# Patient Record
Sex: FEMALE | Race: WHITE
[De-identification: names, ages, dates, MRNs, and addresses within clinical notes are randomized per-mention and may not be internally consistent; named-entity substitution may affect disease eponyms.]

---

## 2017-01-26 ENCOUNTER — HOSPITAL ENCOUNTER (OUTPATIENT)
Dept: HOSPITAL 47 - RADMAMWWP | Age: 37
Discharge: HOME | End: 2017-01-26
Payer: COMMERCIAL

## 2017-01-26 ENCOUNTER — HOSPITAL ENCOUNTER (OUTPATIENT)
Dept: HOSPITAL 47 - RADUSWWP | Age: 37
Discharge: HOME | End: 2017-01-26
Payer: COMMERCIAL

## 2017-01-26 DIAGNOSIS — E04.2: Primary | ICD-10-CM

## 2017-01-26 DIAGNOSIS — Z12.31: Primary | ICD-10-CM

## 2017-01-26 PROCEDURE — 76536 US EXAM OF HEAD AND NECK: CPT

## 2017-01-26 PROCEDURE — 84443 ASSAY THYROID STIM HORMONE: CPT

## 2017-01-26 PROCEDURE — 77063 BREAST TOMOSYNTHESIS BI: CPT

## 2017-01-26 NOTE — US
EXAMINATION TYPE: US thyroid st tissue head/neck

 

DATE OF EXAM: 1/26/2017 4:38 PM

 

COMPARISON: 2/16/2016

 

CLINICAL HISTORY: 36-year-old female E04.1 Thyroid Nodule. FOLLOW UP 

 

TECHNIQUE: Multiple sonographic images of the thyroid gland were obtained.

 

FINDINGS:

 

GLAND SIZE:

 

Right Lobe: 4.8 x 1.3 x 1.3 cm

Left Lobe: 4.6 x 1.5 x 1.4  cm

Isthmus Thickness:  0.3 cm

 

NODULES

 

RIGHT:   # of nodules measured on right:   3 

1. 0.7  X 0.4  x 0.4 cm cystic nodule with internal echogenic focus suggestive of a colloid cyst at t
he upper pole with well-defined margins; .  This nodule is wider than tall and shows no intranodular 
vascularity.

** Prior size: 0.7 x 0.5  x 0.4 cm 

2. 0.3 X 0.3  x 0.2 cm probable spongiform nodule at the mid pole with well-defined margins; .  This 
nodule is wider than tall and shows intranodular vascularity.

 ** Prior size:  0.3 x 0.3  x 0.3 cm 

3. 0.4 X 0.3  x 0.3 cm mixed nodule at the lower pole with well-defined margins; .  This nodule is wi
mary alice than tall and shows intranodular vascularity.

** Prior size:  0.4 x 0.3  x 0.3 cm 

 

 

LEFT:    # of nodules measured on left:  2

1.  1.5 X 1.1  x 1.0 cm primarily cystic nodule with internal echogenic foci at the mid pole with wel
l-defined margins; .  This nodule is round and shows peripheral vascularity.

** Prior size: 1.3 x 1.1  x 1.0 cm 

2. 1.1 X 1.1  x 1.4 cm heterogeneous solid nodule possibly part of or just adjacent to the above-ment
ioned complex cystic nodule at the mid/inferior pole with well-defined margins; .  This nodule shows 
intranodular vascularity.

 ** Prior size:  1.0 x 1.2  x 1.3 cm 

 

ISTHMUS:    # of nodules measured in the isthmus:  0

 

 

 

 

IMPRESSION:

Overall stable exam with 3 nodules on the right and 2 nodules on the left. The dominant solid nodule 
in the left lower pole measures 1.4 x 1.1 cm versus 1.3 x 1.2 cm, previously, not significantly palm
ed.

## 2017-01-27 NOTE — MM
Reason for exam: screening  (asymptomatic).

Baseline mammogram.



History:

Family history of breast cancer in mother at age 54 and breast cancer in 

grandmother at age 82.

Taking hormonal contraceptives for 12 years.



Physical Findings:

Nurse did not find any significant physical abnormalities on exam.



MG 3D Screening Mammo W/Cad

Bilateral CC and MLO view(s) were taken.

No persisting abnormality on tomosynthesis images regional round and punctate 

calcifications anterior left breast are benign.





ASSESSMENT: Negative, BI-RAD 1



RECOMMENDATION:

Routine screening mammogram of both breasts at age 41.

Unless clinical indication to start sooner.

## 2019-01-23 ENCOUNTER — HOSPITAL ENCOUNTER (OUTPATIENT)
Dept: HOSPITAL 47 - RADUSWWP | Age: 39
Discharge: HOME | End: 2019-01-23
Attending: INTERNAL MEDICINE
Payer: COMMERCIAL

## 2019-01-23 DIAGNOSIS — E04.2: Primary | ICD-10-CM

## 2019-01-23 PROCEDURE — 86376 MICROSOMAL ANTIBODY EACH: CPT

## 2019-01-23 PROCEDURE — 84443 ASSAY THYROID STIM HORMONE: CPT

## 2019-01-23 PROCEDURE — 76536 US EXAM OF HEAD AND NECK: CPT

## 2019-01-23 NOTE — US
EXAMINATION TYPE: US thyroid st tissue head/neck

 

DATE OF EXAM: 1/23/2019

 

COMPARISON: US

 

CLINICAL HISTORY: E04.2 thyroid nodule-compare to previous. F/U

 

GLAND SIZE:

 

Right Lobe: 4.3 x 1.5 x 1.2 cm

** Overall Parenchyma:  homogenous

Left Lobe: 4.6 x 1.6 x 1.6 cm

** Overall Parenchyma:  homogeneous

Isthmus Thickness:  0.3 cm

 

NODULES

 

RIGHT:   # of nodules measured on right: 3

1.  0.9 X 0.5  x 0.5 cm  cystic nodule at the upper pole with well-defined margins;  This nodule is w
ider than tall and shows no intranodular vascularity.

** Prior size: 0.7 x 0.4  x 0.4 cm 

2. 0.3 X 0.3  x 0.2 cm hypoechoic solid nodule at the mid pole with well-defined margins;  This nodul
e is wider than tall and shows intranodular vascularity.

 ** Prior size:  0.3 x 0.3  x 0.2 cm 

3. 0.5 X 0.5  x 0.5 cm isoechoic solid nodule at the lower pole with poorly defined margins;  This no
dule is wider than tall and shows intranodular vascularity.

** Prior size:  0.4 x 0.3  x 0.3 cm 

 

 

LEFT:    # of nodules measured on left:  1

1.  2.0 X 1.3  x 1.4 cm  mixed nodule at the mid pole with well-defined margins;  This nodule is wide
r than tall and shows intranodular vascularity.

** Prior size: 2.7 x 1.5  x 1.7 cm **This measurement more accurate on 2014 scan than most recent on 
2017 scan**

 

 

Bilateral neck scanned, no evidence of lymphadenopathy. Stable nodules on right thyroid and tech felt
 stable nodule on left when compared to 2014 scan.

 

 

 

 

 

IMPRESSION:

Stable nonspecific nodularity.

## 2020-07-27 ENCOUNTER — HOSPITAL ENCOUNTER (OUTPATIENT)
Dept: HOSPITAL 47 - RADUSWWP | Age: 40
Discharge: HOME | End: 2020-07-27
Attending: INTERNAL MEDICINE
Payer: COMMERCIAL

## 2020-07-27 DIAGNOSIS — E04.2: Primary | ICD-10-CM

## 2020-07-27 PROCEDURE — 76536 US EXAM OF HEAD AND NECK: CPT

## 2020-07-28 NOTE — US
EXAMINATION TYPE: US thyroid st tissue head/neck

 

DATE OF EXAM: 7/27/2020

 

COMPARISON: US 1/23/2019

 

CLINICAL HISTORY: E04.1 SINGLE THYROID NODULE.

 

GLAND SIZE:

 

Right Lobe: 4.6 x 1.5 x 1.4 cm

** Overall Parenchyma:  homogenous

Left Lobe: 4.9 x 1.7 x 1.6 cm

** Overall Parenchyma:  homogeneous

Isthmus Thickness:  0.3 cm

 

NODULES

 

RIGHT:   # of nodules measured on right: 2

1.  0.5 X 0.3  x 0.3 cm hypoechoic mixed nodule at the lower pole with well-defined margins; .  This 
nodule is wider than tall and shows intranodular vascularity.

** Prior size: 0.4 x0.3 x 0.3  cm

2. 1.0 X 0.5  x 0.5 cm hypoechoic cystic nodule at the mid pole with well-defined margins; .  This no
dule is wider than tall and shows no intranodular vascularity.

 ** Prior size:  0.9 x 0.5  x 0.5 cm 

 

 

LEFT:    # of nodules measured on left:  1

1.  3.2 X 1.5  x 1.5 cm hypoechoic mixed nodule at the mid pole with well-defined margins; .  This no
dule is wider than tall and shows intranodular vascularity.

** Prior size: 2.0 x 1.3  x 1.4 cm 

 

ISTHMUS:    # of nodules measured in the isthmus:  0

 

 

Bilateral neck scanned, no evidence of lymphadenopathy.

 

 

 

 

 

IMPRESSION:

Nonspecific thyroid nodularity seen bilaterally. Enlarging nodule left thyroid lobe.

## 2020-07-29 ENCOUNTER — HOSPITAL ENCOUNTER (OUTPATIENT)
Dept: HOSPITAL 47 - LABWHC1 | Age: 40
Discharge: HOME | End: 2020-07-29
Attending: INTERNAL MEDICINE
Payer: COMMERCIAL

## 2020-07-29 DIAGNOSIS — E04.1: ICD-10-CM

## 2020-07-29 DIAGNOSIS — E78.5: Primary | ICD-10-CM

## 2020-07-29 LAB
ALBUMIN SERPL-MCNC: 4.3 G/DL (ref 3.8–4.9)
ALBUMIN/GLOB SERPL: 2.39 G/DL (ref 1.6–3.17)
ALP SERPL-CCNC: 43 U/L (ref 41–126)
ALT SERPL-CCNC: 16 U/L (ref 8–44)
ANION GAP SERPL CALC-SCNC: 5.9 MMOL/L (ref 4–12)
AST SERPL-CCNC: 20 U/L (ref 13–35)
BASOPHILS # BLD AUTO: 0 K/UL (ref 0–0.2)
BASOPHILS NFR BLD AUTO: 1 %
BUN SERPL-SCNC: 17 MG/DL (ref 9–27)
BUN/CREAT SERPL: 21.25 RATIO (ref 12–20)
CALCIUM SPEC-MCNC: 9.4 MG/DL (ref 8.7–10.3)
CHLORIDE SERPL-SCNC: 108 MMOL/L (ref 96–109)
CHOLEST SERPL-MCNC: 166 MG/DL (ref 0–200)
CO2 SERPL-SCNC: 26.1 MMOL/L (ref 21.6–31.8)
EOSINOPHIL # BLD AUTO: 0.2 K/UL (ref 0–0.7)
EOSINOPHIL NFR BLD AUTO: 3 %
ERYTHROCYTE [DISTWIDTH] IN BLOOD BY AUTOMATED COUNT: 4.55 M/UL (ref 3.8–5.4)
ERYTHROCYTE [DISTWIDTH] IN BLOOD: 12.4 % (ref 11.5–15.5)
GLOBULIN SER CALC-MCNC: 1.8 G/DL (ref 1.6–3.3)
GLUCOSE SERPL-MCNC: 85 MG/DL (ref 70–110)
HCT VFR BLD AUTO: 43.4 % (ref 34–46)
HDLC SERPL-MCNC: 66 MG/DL (ref 40–60)
HGB BLD-MCNC: 14.2 GM/DL (ref 11.4–16)
LDLC SERPL CALC-MCNC: 81.2 MG/DL (ref 0–131)
LYMPHOCYTES # SPEC AUTO: 2.1 K/UL (ref 1–4.8)
LYMPHOCYTES NFR SPEC AUTO: 33 %
MCH RBC QN AUTO: 31.3 PG (ref 25–35)
MCHC RBC AUTO-ENTMCNC: 32.8 G/DL (ref 31–37)
MCV RBC AUTO: 95.5 FL (ref 80–100)
MONOCYTES # BLD AUTO: 0.3 K/UL (ref 0–1)
MONOCYTES NFR BLD AUTO: 5 %
NEUTROPHILS # BLD AUTO: 3.7 K/UL (ref 1.3–7.7)
NEUTROPHILS NFR BLD AUTO: 58 %
PLATELET # BLD AUTO: 221 K/UL (ref 150–450)
POTASSIUM SERPL-SCNC: 4.1 MMOL/L (ref 3.5–5.5)
PROT SERPL-MCNC: 6.1 G/DL (ref 6.2–8.2)
SODIUM SERPL-SCNC: 140 MMOL/L (ref 135–145)
T4 FREE SERPL-MCNC: 1.2 NG/DL (ref 0.8–1.8)
TRIGL SERPL-MCNC: 94 MG/DL (ref 0–149)
VLDLC SERPL CALC-MCNC: 18.8 MG/DL (ref 5–40)
WBC # BLD AUTO: 6.4 K/UL (ref 3.8–10.6)

## 2020-07-29 PROCEDURE — 80061 LIPID PANEL: CPT

## 2020-07-29 PROCEDURE — 84443 ASSAY THYROID STIM HORMONE: CPT

## 2020-07-29 PROCEDURE — 36415 COLL VENOUS BLD VENIPUNCTURE: CPT

## 2020-07-29 PROCEDURE — 84439 ASSAY OF FREE THYROXINE: CPT

## 2020-07-29 PROCEDURE — 85025 COMPLETE CBC W/AUTO DIFF WBC: CPT

## 2020-07-29 PROCEDURE — 80053 COMPREHEN METABOLIC PANEL: CPT

## 2021-08-24 ENCOUNTER — HOSPITAL ENCOUNTER (OUTPATIENT)
Dept: HOSPITAL 47 - RADMAMWWP | Age: 41
Discharge: HOME | End: 2021-08-24
Attending: OBSTETRICS & GYNECOLOGY
Payer: COMMERCIAL

## 2021-08-24 DIAGNOSIS — Z80.3: ICD-10-CM

## 2021-08-24 DIAGNOSIS — Z12.31: Primary | ICD-10-CM

## 2021-08-24 PROCEDURE — 77067 SCR MAMMO BI INCL CAD: CPT

## 2021-08-24 PROCEDURE — 77063 BREAST TOMOSYNTHESIS BI: CPT

## 2021-08-25 NOTE — MM
Reason for exam: screening  (asymptomatic).

Last mammogram was performed 4 years and 7 months ago.



History:

Family history of breast cancer in mother at age 54 and breast cancer in 

grandmother at age 82.

Taking hormonal contraceptives for 12 years.



Physical Findings:

A clinical breast exam by your physician is recommended on an annual basis and 

results should be correlated with mammographic findings.



MG 3D Screening Mammo W/Cad

Bilateral CC and MLO view(s) were taken.

Prior study comparison: January 26, 2017, bilateral MG 3d screening mammo w/cad.

The breast tissue is heterogeneously dense. This may lower the sensitivity of 

mammography.  There is no discrete abnormality.  No significant changes when 

compared with prior studies.





ASSESSMENT: Negative, BI-RAD 1



RECOMMENDATION:

Routine screening mammogram of both breasts in 1 year.

## 2022-07-05 ENCOUNTER — HOSPITAL ENCOUNTER (OUTPATIENT)
Dept: HOSPITAL 47 - RADUSWWP | Age: 42
Discharge: HOME | End: 2022-07-05
Attending: INTERNAL MEDICINE
Payer: COMMERCIAL

## 2022-07-05 DIAGNOSIS — E04.2: Primary | ICD-10-CM

## 2022-07-05 PROCEDURE — 76536 US EXAM OF HEAD AND NECK: CPT

## 2022-07-05 NOTE — US
EXAMINATION TYPE: US thyroid st tissue head/neck

 

DATE OF EXAM: 7/5/2022

 

COMPARISON: US 07/2020

 

CLINICAL HISTORY: E04.1 THYROID NODULE. THYROID NODULES

 

GLAND SIZE:

 

Right Lobe: 4.4 x 1.6 x 1.3cm

** Overall Parenchyma:  homogenous

Left Lobe: 4.9 x 1.8 x 1.5cm

** Overall Parenchyma:  homogeneous

Isthmus Thickness:  0.2cm

 

NODULES

 

RIGHT:   # of nodules measured on right: 1

1.  0.5 x 0.4 x 0.3cm, lower medial, spongiform, hypoechoic nodule, which is taller than wide, with s
mooth margins, without echogenic foci.

 

PREVIOUS NODULE MEASURED IS NO LONGER PRESENT ON TODAY'S EXAM

 

LEFT:    # of nodules measured on left:  1

1.  2.4 x 1.3x 1.3cm mid , predominantly cystic, anechoic nodule, which is wider than tall, with ill-
defined margins, with echogenic foci.

 ** Prior size: 3.2 x 1.5 x 1.5cm

 

ISTHMUS:    # of nodules measured in the isthmus:  0

Bilateral neck scanned, no evidence of lymphadenopathy.

 

Homogeneous normal-sized thyroid with stable 2.4 cm mixed solid and cystic left thyroid nodule. Large
st 1.0 cm cystic nodule right thyroid lobe prior study is not clearly seen on current study

 

IMPRESSION: As above. No new or enlarging suspicious solid nodules identified.

## 2023-04-03 ENCOUNTER — HOSPITAL ENCOUNTER (EMERGENCY)
Dept: HOSPITAL 47 - EC | Age: 43
Discharge: HOME | End: 2023-04-03
Payer: COMMERCIAL

## 2023-04-03 ENCOUNTER — HOSPITAL ENCOUNTER (INPATIENT)
Dept: HOSPITAL 47 - EC | Age: 43
LOS: 3 days | Discharge: HOME | DRG: 603 | End: 2023-04-06
Attending: HOSPITALIST | Admitting: HOSPITALIST
Payer: COMMERCIAL

## 2023-04-03 VITALS
RESPIRATION RATE: 19 BRPM | DIASTOLIC BLOOD PRESSURE: 88 MMHG | SYSTOLIC BLOOD PRESSURE: 144 MMHG | HEART RATE: 75 BPM | TEMPERATURE: 97.8 F

## 2023-04-03 DIAGNOSIS — A28.0: ICD-10-CM

## 2023-04-03 DIAGNOSIS — B96.89: ICD-10-CM

## 2023-04-03 DIAGNOSIS — S61.451A: Primary | ICD-10-CM
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
## 2023-04-03 DIAGNOSIS — L03.113: Primary | ICD-10-CM

## 2023-04-03 DIAGNOSIS — S80.811A: ICD-10-CM

## 2023-04-03 DIAGNOSIS — S61.451A: ICD-10-CM

## 2023-04-03 DIAGNOSIS — Z79.899: ICD-10-CM

## 2023-04-03 LAB
ALBUMIN SERPL-MCNC: 4.6 G/DL (ref 3.5–5)
ALP SERPL-CCNC: 46 U/L (ref 38–126)
ALT SERPL-CCNC: 21 U/L (ref 4–34)
ANION GAP SERPL CALC-SCNC: 14 MMOL/L
AST SERPL-CCNC: 27 U/L (ref 14–36)
BASOPHILS # BLD AUTO: 0 K/UL (ref 0–0.2)
BASOPHILS NFR BLD AUTO: 0 %
BUN SERPL-SCNC: 11 MG/DL (ref 7–17)
CALCIUM SPEC-MCNC: 9.5 MG/DL (ref 8.4–10.2)
CHLORIDE SERPL-SCNC: 106 MMOL/L (ref 98–107)
CO2 SERPL-SCNC: 19 MMOL/L (ref 22–30)
EOSINOPHIL # BLD AUTO: 0.1 K/UL (ref 0–0.7)
EOSINOPHIL NFR BLD AUTO: 1 %
ERYTHROCYTE [DISTWIDTH] IN BLOOD BY AUTOMATED COUNT: 4.5 M/UL (ref 3.8–5.4)
ERYTHROCYTE [DISTWIDTH] IN BLOOD: 12.6 % (ref 11.5–15.5)
GLUCOSE SERPL-MCNC: 98 MG/DL (ref 74–99)
HCT VFR BLD AUTO: 41.3 % (ref 34–46)
HGB BLD-MCNC: 14.4 GM/DL (ref 11.4–16)
LYMPHOCYTES # SPEC AUTO: 0.9 K/UL (ref 1–4.8)
LYMPHOCYTES NFR SPEC AUTO: 7 %
MCH RBC QN AUTO: 32 PG (ref 25–35)
MCHC RBC AUTO-ENTMCNC: 34.9 G/DL (ref 31–37)
MCV RBC AUTO: 91.7 FL (ref 80–100)
MONOCYTES # BLD AUTO: 0.5 K/UL (ref 0–1)
MONOCYTES NFR BLD AUTO: 4 %
NEUTROPHILS # BLD AUTO: 11.9 K/UL (ref 1.3–7.7)
NEUTROPHILS NFR BLD AUTO: 88 %
PLATELET # BLD AUTO: 172 K/UL (ref 150–450)
POTASSIUM SERPL-SCNC: 4.1 MMOL/L (ref 3.5–5.1)
PROT SERPL-MCNC: 7.5 G/DL (ref 6.3–8.2)
SODIUM SERPL-SCNC: 139 MMOL/L (ref 137–145)
WBC # BLD AUTO: 13.5 K/UL (ref 3.8–10.6)

## 2023-04-03 PROCEDURE — 96375 TX/PRO/DX INJ NEW DRUG ADDON: CPT

## 2023-04-03 PROCEDURE — 83605 ASSAY OF LACTIC ACID: CPT

## 2023-04-03 PROCEDURE — 96361 HYDRATE IV INFUSION ADD-ON: CPT

## 2023-04-03 PROCEDURE — 87040 BLOOD CULTURE FOR BACTERIA: CPT

## 2023-04-03 PROCEDURE — 86140 C-REACTIVE PROTEIN: CPT

## 2023-04-03 PROCEDURE — 96366 THER/PROPH/DIAG IV INF ADDON: CPT

## 2023-04-03 PROCEDURE — 85025 COMPLETE CBC W/AUTO DIFF WBC: CPT

## 2023-04-03 PROCEDURE — 80048 BASIC METABOLIC PNL TOTAL CA: CPT

## 2023-04-03 PROCEDURE — 99283 EMERGENCY DEPT VISIT LOW MDM: CPT

## 2023-04-03 PROCEDURE — 96365 THER/PROPH/DIAG IV INF INIT: CPT

## 2023-04-03 PROCEDURE — 80053 COMPREHEN METABOLIC PANEL: CPT

## 2023-04-03 PROCEDURE — 99284 EMERGENCY DEPT VISIT MOD MDM: CPT

## 2023-04-03 PROCEDURE — 85652 RBC SED RATE AUTOMATED: CPT

## 2023-04-03 PROCEDURE — 36415 COLL VENOUS BLD VENIPUNCTURE: CPT

## 2023-04-03 RX ADMIN — KETOROLAC TROMETHAMINE PRN MG: 15 INJECTION, SOLUTION INTRAMUSCULAR; INTRAVENOUS at 22:58

## 2023-04-03 RX ADMIN — ASPIRIN SCH TAB: 325 TABLET ORAL at 20:03

## 2023-04-03 RX ADMIN — AMPICILLIN SODIUM AND SULBACTAM SODIUM SCH MLS/HR: 2; 1 INJECTION, POWDER, FOR SOLUTION INTRAMUSCULAR; INTRAVENOUS at 19:16

## 2023-04-03 RX ADMIN — IBUPROFEN PRN MG: 400 TABLET, FILM COATED ORAL at 19:22

## 2023-04-03 NOTE — HP
HISTORY AND PHYSICAL



CHIEF COMPLAINT:

Cat bite.



HISTORY OF PRESENT ILLNESS:

This is a 43-year-old woman with a past medical history of no significant medical

illness, was bitten by a neighbor's cat yesterday while the patient was trying to get

the cat back.  The bite was on the metacarpophalangeal area of the right hand.  The

patient had developed pain and swelling despite taking Augmentin.  The patient came to

[facility] and was admitted for further evaluation and treatment.  There is no

history of any fever, rigors, or chills at this time.  The hand x-ray was unremarkable.



PAST MEDICAL HISTORY:

No significant cardiorespiratory illness.



MEDICATIONS:

Home medications are reviewed include Imitrex, doses and rest of medications noted.



ALLERGIES:

None.



FAMILY HISTORY:

No history of heart disease or strokes in the family.



SOCIAL HISTORY:

The patient is a .  No history of smoking or alcohol.



REVIEW OF SYSTEMS:

A 14-point review is negative except as mentioned earlier.



PHYSICAL EXAMINATION:

VITAL SIGNS:  Pulse is 81, blood pressure 160/89, respirations 20.

HEENT:  Conjunctivae normal.

NECK:  No jugular venous distention.

CARDIOVASCULAR:  S1, S2.

ABDOMEN:  Soft, nontender.

LEGS:  No edema, no swelling.

NERVOUS SYSTEM:  No focal deficits.

EXTREMITIES:  Right hand, swelling and painful, limitation of movement of the 2nd, 3rd

and 4th fingers present, tenderness and bite marks present on the dorsum of the right

middle hand.

SKIN:  As mentioned.



LABORATORY DATA:

Noted.  WBC 13.5.



ASSESSMENT:

1. Cat bite with severe infection cellulitis of the right hand.

2. Severe pain.

3. Failure of outpatient treatment.

4. Elevated lactic acid.



RECOMMENDATIONS:

This 43-year-old woman presented with multiple complex medical issues.  At this time, I

will recommend to continue the current management, continue symptomatic treatment.

Otherwise, continue IV fluids, broad-spectrum IV antibiotics.  Infectious Disease and

orthopedic evaluations.  Prognosis guarded.  Further recommendations to follow.  See

orders for further details.





MMODL / IJN: [id number] / Job#: [id number]

## 2023-04-03 NOTE — ED
Recheck HPI





- General


Chief Complaint: Recheck/Abnormal Lab/Rx


Stated Complaint: Rt hand cat bite


Time Seen by Provider: 04/03/23 09:20


Source: patient, RN notes reviewed


Mode of arrival: ambulatory


Limitations: no limitations





- History of Present Illness


Initial Comments: 


This is a 43-year-old female who presents to the emergency department for a cat 

bite to the right hand.  I evaluated the patient earlier this morning shortly 

after 6 AM.  The swelling was localized to the MCP joints on the right hand at 

that time and she was discharged home on a course of Augmentin.  At this point, 

she has taken one dose of Augmentin.  She returns to the emergency department, 

because the swelling and pain have progressed.  Denies any fevers or chills.





Denies any fevers, chills, sore throat, cough, dyspnea, chest pain, 

palpitations, abdominal pain, nausea, vomiting, diarrhea, back pain, or 

headaches.





MD Complaint: other (Recheck cat bite)





- Related Data


                                Home Medications











 Medication  Instructions  Recorded  Confirmed


 


Amoxic-Pot Clav 875-125Mg 1 tab PO Q12H 04/03/23 04/03/23





[Augmentin 875-125]   


 


Cetirizine HCl [Zyrtec] 10 mg PO DAILY 04/03/23 04/03/23


 


SUMAtriptan succinate [Imitrex] 25 mg PO BID PRN 04/03/23 04/03/23


 


norethindrone-e.estradioL-iron 1 tab PO HS 04/03/23 04/03/23





[Francis 24 Fe 1 mg-20 Mcg Tablet]   








                                  Previous Rx's











 Medication  Instructions  Recorded


 


Ibuprofen 600 mg PO Q6H PRN #20 tab 04/03/23











                                    Allergies











Allergy/AdvReac Type Severity Reaction Status Date / Time


 


No Known Allergies Allergy   Verified 04/03/23 11:22














Review of Systems


ROS Statement: 


Those systems with pertinent positive or pertinent negative responses have been 

documented in the HPI.





ROS Other: All systems not noted in ROS Statement are negative.





Past Medical History


Past Medical History: No Reported History


History of Any Multi-Drug Resistant Organisms: None Reported


Past Surgical History: No Surgical Hx Reported


Past Psychological History: No Psychological Hx Reported


Smoking Status: Never smoker


Past Alcohol Use History: None Reported


Past Drug Use History: None Reported





General Exam


Limitations: no limitations


General appearance: alert, in no apparent distress


Head exam: Present: atraumatic, normocephalic, normal inspection


Respiratory exam: Present: normal lung sounds bilaterally.  Absent: respiratory 

distress, wheezes, rales, rhonchi, stridor


Cardiovascular Exam: Present: regular rate, normal rhythm, normal heart sounds. 

Absent: systolic murmur, diastolic murmur, rubs, gallop, clicks


Extremities exam: Present: other (Swelling and erythema over the second, third, 

and fourth MCP joints to the right hand.  There is swelling progressing up to 

the wrist.  Limited range of motion of all 5 digits secondary to pain and 

swelling.)


Neurological exam: Present: alert, oriented X3, CN II-XII intact


Psychiatric exam: Present: normal affect, normal mood


Skin exam: Present: warm, dry, intact, normal color.  Absent: rash





Course


                                   Vital Signs











  04/03/23 04/03/23





  09:28 14:32


 


Temperature 98.2 F 


 


Pulse Rate 81 83


 


Respiratory 20 16





Rate  


 


Blood Pressure 165/89 141/81


 


O2 Sat by Pulse 100 97





Oximetry  














Medical Decision Making





- Medical Decision Making


This is a 43-year-old female who presents to the emergency department for a cat 

bite to the right hand.





Was pt. sent in by a medical professional or institution?


@  -No   


Did you speak to anyone other than the patient for history?  


@  -No


Did you review nursing and triage notes? 


@  -Yes, and I agree, it is accurate with regards to the patient's symptoms.


Were old charts reviewed? 


@  -No


Differential Diagnosis? 


@  -Not applicable


X-rays interpreted by me (1pt min.)?


@  -X-ray of the right hand obtained.  My interpretation identifies swelling to 

the dorsal aspect of the right hand.  There are no obvious fractures or foreign 

bodies.


What testing was considered but not performed? (CT, X-rays, U/S, labs)? Why?


@  -None


What meds were considered but not given? Why?


@  -None


Did you discuss the management of the patient with other professionals?


@  -Yes, Dr. Lyon with [facility] who accepts the patient for admission. 


Did you reconcile home meds?


@  -Yes


Was smoking cessation discussed for >3mins.?


@  -No


Was critical care preformed (if so, how long)?


@  -No


Were there social determinants of health that impacted care today? How? 

(Homelessness, low income, unemployed, alcoholism, drug addiction, 

transportation, low edu. Level, literacy, decrease access to med. care, senior living, 

rehab)?


@  -No


Was there de-escalation of care discussed even if they declined? (Discuss DNR or

withdrawal of care, Hospice)?


@  -No


What co-morbidities impacted this encounter? (DM, HTN, Smoking, COPD, CAD, 

Cancer, CVA, Hep., AIDS, mental health diagnosis, sleep apnea, morbid obesity)?


@  -None


Was patient admitted / discharged?


@  -Admitted.  Lab work obtained revealing leukocytosis and elevated 

inflammatory markers and lactic acid.  X-ray of the right hand obtained 

revealing notable swelling to the dorsal aspect of the right hand.  Blood 

cultures were obtained.  The swelling does appear to have progressed since her 

visit at 6 AM earlier today.  She was given a dose of Zosyn in the emergency 

department.  We discussed the option of admission versus discharge home with 

strict return parameters again.  Patient requests to proceed with admission.  

Patient admitted to medicine with consult placed for orthopedics and infectious 

disease per the admitting team's request.  Patient also started on Unasyn for 

infectious management.


Undiagnosed new problem with uncertain prognosis?


@  -None


Drug Therapy requiring intensive monitoring for toxicity (Heparin, Nitro, 

Insulin, Cardizem)?


@  -None


Were any procedures done?


@  -None


Diagnosis/symptom?


@  -Cat bite to right hand


Acute, or Chronic, or Acute on Chronic?


@  -Acute


Uncomplicated (without systemic symptoms) or Complicated (systemic symptoms)?


@  -Complicated


Side effects of treatment?


@  -None


Exacerbation, Progression, or Severe Exacerbation]


@  -Not applicable


Poses a threat to life or bodily function?


@  -Yes





This case was discussed in detail with the attending ED physician, Dr. Esteves.  

Presentation, findings, and treatment plan discussed in detail as well. 








- Lab Data


Result diagrams: 


                                 04/03/23 10:38





                                 04/03/23 09:48


                                   Lab Results











  04/03/23 04/03/23 04/03/23 Range/Units





  09:48 09:48 10:38 


 


WBC    13.5 H  (3.8-10.6)  k/uL


 


RBC    4.50  (3.80-5.40)  m/uL


 


Hgb    14.4  (11.4-16.0)  gm/dL


 


Hct    41.3  (34.0-46.0)  %


 


MCV    91.7  (80.0-100.0)  fL


 


MCH    32.0  (25.0-35.0)  pg


 


MCHC    34.9  (31.0-37.0)  g/dL


 


RDW    12.6  (11.5-15.5)  %


 


Plt Count    172  (150-450)  k/uL


 


MPV    8.5  


 


Neutrophils %    88  %


 


Lymphocytes %    7  %


 


Monocytes %    4  %


 


Eosinophils %    1  %


 


Basophils %    0  %


 


Neutrophils #    11.9 H  (1.3-7.7)  k/uL


 


Lymphocytes #    0.9 L  (1.0-4.8)  k/uL


 


Monocytes #    0.5  (0-1.0)  k/uL


 


Eosinophils #    0.1  (0-0.7)  k/uL


 


Basophils #    0.0  (0-0.2)  k/uL


 


ESR    6  (0-20)  mm/hr


 


Sodium  139    (137-145)  mmol/L


 


Potassium  4.1    (3.5-5.1)  mmol/L


 


Chloride  106    ()  mmol/L


 


Carbon Dioxide  19 L    (22-30)  mmol/L


 


Anion Gap  14    mmol/L


 


BUN  11    (7-17)  mg/dL


 


Creatinine  0.69    (0.52-1.04)  mg/dL


 


Est GFR (CKD-EPI)AfAm  >90    (>60 ml/min/1.73 sqM)  


 


Est GFR (CKD-EPI)NonAf  >90    (>60 ml/min/1.73 sqM)  


 


Glucose  98    (74-99)  mg/dL


 


Lactic Ac Sepsis Rflx     


 


Plasma Lactic Acid Lalo   2.2 H*   (0.7-2.0)  mmol/L


 


Calcium  9.5    (8.4-10.2)  mg/dL


 


Total Bilirubin  0.8    (0.2-1.3)  mg/dL


 


AST  27    (14-36)  U/L


 


ALT  21    (4-34)  U/L


 


Alkaline Phosphatase  46    ()  U/L


 


C-Reactive Protein  2.3 H    (<1.0)  mg/dL


 


Total Protein  7.5    (6.3-8.2)  g/dL


 


Albumin  4.6    (3.5-5.0)  g/dL














  04/03/23 Range/Units





  10:45 


 


WBC   (3.8-10.6)  k/uL


 


RBC   (3.80-5.40)  m/uL


 


Hgb   (11.4-16.0)  gm/dL


 


Hct   (34.0-46.0)  %


 


MCV   (80.0-100.0)  fL


 


MCH   (25.0-35.0)  pg


 


MCHC   (31.0-37.0)  g/dL


 


RDW   (11.5-15.5)  %


 


Plt Count   (150-450)  k/uL


 


MPV   


 


Neutrophils %   %


 


Lymphocytes %   %


 


Monocytes %   %


 


Eosinophils %   %


 


Basophils %   %


 


Neutrophils #   (1.3-7.7)  k/uL


 


Lymphocytes #   (1.0-4.8)  k/uL


 


Monocytes #   (0-1.0)  k/uL


 


Eosinophils #   (0-0.7)  k/uL


 


Basophils #   (0-0.2)  k/uL


 


ESR   (0-20)  mm/hr


 


Sodium   (137-145)  mmol/L


 


Potassium   (3.5-5.1)  mmol/L


 


Chloride   ()  mmol/L


 


Carbon Dioxide   (22-30)  mmol/L


 


Anion Gap   mmol/L


 


BUN   (7-17)  mg/dL


 


Creatinine   (0.52-1.04)  mg/dL


 


Est GFR (CKD-EPI)AfAm   (>60 ml/min/1.73 sqM)  


 


Est GFR (CKD-EPI)NonAf   (>60 ml/min/1.73 sqM)  


 


Glucose   (74-99)  mg/dL


 


Lactic Ac Sepsis Rflx  Y  


 


Plasma Lactic Acid Lalo   (0.7-2.0)  mmol/L


 


Calcium   (8.4-10.2)  mg/dL


 


Total Bilirubin   (0.2-1.3)  mg/dL


 


AST   (14-36)  U/L


 


ALT   (4-34)  U/L


 


Alkaline Phosphatase   ()  U/L


 


C-Reactive Protein   (<1.0)  mg/dL


 


Total Protein   (6.3-8.2)  g/dL


 


Albumin   (3.5-5.0)  g/dL














- Radiology Data


Radiology results: report reviewed, image reviewed





Disposition


Clinical Impression: 


 Cat bite of right hand





Disposition: ADMITTED AS IP TO THIS HOSP

## 2023-04-03 NOTE — ED
Animal Bite HPI





- General


Chief Complaint: Animal Bite


Stated Complaint: Swollen right hand - animal bite


Time Seen by Provider: 23 05:56


Source: patient, RN notes reviewed


Mode of arrival: ambulatory


Limitations: no limitations





- History of Present Illness


Initial Comments: 


This is a 43-year-old female who presents to the emergency department for a cat 

bite.  States that she was trying to help her neighbors out by bringing their 

cat in from outside.  The cat subsequently bit her right hand and scratched her 

right calf.  She is not experiencing any pain or swelling to the right calf.  

She does have a bite to the back of her right hand that is concerning her the 

most.  When she woke up this morning, she said it was red, swollen, and painful.

 Her tetanus vaccine is up-to-date.  States that this is an indoor cat and she 

is not entirely positive if it is up-to-date on its rabies vaccines, but 

believes that it is and it is an indoor cat.  She is still trying to reach her 

neighbors to verify this information.  Denies any fevers or chills.  She has not

noticed any redness or swelling going up the arm.  She is still able to fully 

move the hand and fingers.





Denies any fevers, chills, sore throat, cough, dyspnea, chest pain, 

palpitations, abdominal pain, nausea, vomiting, diarrhea, back pain, or 

headaches.





MD Complaint: animal bite


Onset/Timin


-: days(s)


Right: Hand


Animal: cat


Description: household pet


Mechanism: bite, scratch


Associated Symptoms: erythema





- Related Data


Patient Tetanus UTD: Yes


                                  Previous Rx's











 Medication  Instructions  Recorded


 


Amoxic-Pot Clav 875-125Mg 1 tab PO Q12HR 10 Days #20 tab 23





[Augmentin 875-125]  


 


Ibuprofen 600 mg PO Q6H PRN #20 tab 23











                                    Allergies











Allergy/AdvReac Type Severity Reaction Status Date / Time


 


No Known Allergies Allergy   Verified 23 05:23














Review of Systems


ROS Statement: 


Those systems with pertinent positive or pertinent negative responses have been 

documented in the HPI.





ROS Other: All systems not noted in ROS Statement are negative.





Past Medical History


Past Medical History: No Reported History


History of Any Multi-Drug Resistant Organisms: None Reported


Past Surgical History: No Surgical Hx Reported


Past Psychological History: No Psychological Hx Reported


Smoking Status: Never smoker


Past Alcohol Use History: None Reported


Past Drug Use History: None Reported





General Exam


Limitations: no limitations


General appearance: alert, in no apparent distress


Head exam: Present: atraumatic, normocephalic, normal inspection


Respiratory exam: Present: normal lung sounds bilaterally.  Absent: respiratory 

distress, wheezes, rales, rhonchi, stridor


Cardiovascular Exam: Present: regular rate, normal rhythm, normal heart sounds. 

Absent: systolic murmur, diastolic murmur, rubs, gallop, clicks


Extremities exam: Present: other (2 puncture wounds near the right 3rd MCP 

joint.  Erythema and swelling near MCP joints 2, 3, and 4.  No areas of firmness

to suggest a foreign body.  There is no active bleeding.  There is no tracking 

going up the arm or reaching the wrist.  Full range of motion of the hand, 

wrist, and all 5 digits.)


Neurological exam: Present: alert, oriented X3, CN II-XII intact


Psychiatric exam: Present: normal affect, normal mood


Skin exam: Present: other (2 superficial abrasions to the right calf.  No 

surrounding erythema or swelling.  No active bleeding.)





Course


                                   Vital Signs











  23





  05:20


 


Temperature 97.8 F


 


Pulse Rate 75


 


Respiratory 19





Rate 


 


Blood Pressure 144/88


 


O2 Sat by Pulse 100





Oximetry 














Medical Decision Making





- Medical Decision Making


This is a 43-year-old female who presents to the emergency department for a cat 

bite.





Was pt. sent in by a medical professional or institution?


@  -No   


Did you speak to anyone other than the patient for history?  


@  -No


Did you review nursing and triage notes? 


@  -Yes, and I agree, it is accurate with regards to the patient's symptoms.


Were old charts reviewed? 


@  -No


Differential Diagnosis? 


@  -Not applicable


What testing was considered but not performed? (CT, X-rays, U/S, labs)? Why?


@  -None


What meds were considered but not given? Why?


@  -None


Did you discuss the management of the patient with other professionals?


@  -No


Did you reconcile home meds?


@  -No


Was smoking cessation discussed for >3mins.?


@  -No


Was critical care preformed (if so, how long)?


@  -No


Were there social determinants of health that impacted care today? How? 

(Homelessness, low income, unemployed, alcoholism, drug addiction, 

transportation, low edu. Level, literacy, decrease access to med. care, care home, 

rehab)?


@  -No


Was there de-escalation of care discussed even if they declined? (Discuss DNR or

withdrawal of care, Hospice)?


@  -No


What co-morbidities impacted this encounter? (DM, HTN, Smoking, COPD, CAD, 

Cancer, CVA, Hep., AIDS, mental health diagnosis, sleep apnea, morbid obesity)?


@  -None


Was patient admitted / discharged?


@  -Discharged.  The bite to the hand is infected, as evidenced by the erythema 

and swelling.  However, this is localized at this time around the right MCP 

joints.  The scratches to the right calf have no surrounding erythema, swelling,

or drainage. Patient also has no fevers or tachycardia to suggest systemic 

involvement.  Discussed with the patient, that we can start with outpatient 

management because the infection is localized and there is no evidence of 

systemic involvement.  However, she needs to watch this very closely.  I did 

outline the affected area with a marker.  We discussed that if the redness and 

swelling start to go past the marked area or she develops any systemic symptoms 

such as a fever, she needs to return to the emergency department immediately.  I

did also offer the rabies vaccine, however she declined at this time.  Advised 

she get in contact with her neighbors to verify whether or not the cat is 

vaccinated.  If it is not vaccinated, she should either return to the emergency 

department or contact her primary care provider to receive the rabies vaccine.  

Prescription for Augmentin and ibuprofen provided with dosing instructions 

reviewed.  She was given the first dose of Augmentin in the emergency 

department.  Again, very strict return parameters were discussed.  Patient 

expresses understanding.


Undiagnosed new problem with uncertain prognosis?


@  -None


Drug Therapy requiring intensive monitoring for toxicity (Heparin, Nitro, 

Insulin, Cardizem)?


@  -None


Were any procedures done?


@  -None


Diagnosis/symptom?


@  -Cat bite


Acute, or Chronic, or Acute on Chronic?


@  -Acute


Uncomplicated (without systemic symptoms) or Complicated (systemic symptoms)?


@  -Uncomplicated


Side effects of treatment?


@  -None


Exacerbation, Progression, or Severe Exacerbation]


@  -Not applicable


Poses a threat to life or bodily function?


@  -If the infection progresses, it can become a limb threatening issue. 





Return precautions reviewed in depth, the patient is instructed to return to the

emergency department with any new, worsening, or concerning symptoms. Patient 

verbalized understanding. 





This case was discussed in detail with the attending ED physician, Dr. Aguilar. 

Presentation, findings, and treatment plan discussed in detail as well. 








Disposition


Clinical Impression: 


 Cat bite





Disposition: HOME SELF-CARE


Instructions (If sedation given, give patient instructions):  Animal Bite (ED)


Additional Instructions: 


Return to the emergency department with any new, worsening, or concerning 

symptoms.  Pay very close attention to the marked line.  If the redness and 

swelling starts to travel past this line or you develop any fevers/chills, 

return to the emergency department immediately.  Take the antibiotic as 

prescribed for 10 days, with your second dose beginning this evening around 6-7 

PM.  Alternate with ibuprofen and Tylenol as needed for pain relief.  Follow up 

with your primary care provider in 1-2 days.


Prescriptions: 


Amoxic-Pot Clav 875-125Mg [Augmentin 875-125] 1 tab PO Q12HR 10 Days #20 tab


Ibuprofen 600 mg PO Q6H PRN #20 tab


 PRN Reason: Pain


Is patient prescribed a controlled substance at d/c from ED?: No


Referrals: 


Vincent Dillard MD [Primary Care Provider] - 1-2 days

## 2023-04-03 NOTE — P.CNOR
History of Present Illness





- [facility]


Consult date: 04/03/23


Consult reason: other (Right hand cat bite)


History of present illness: 





The patient is a 42 y/o previously healthy female who presents to the emergency 

department for a cat bite to the right hand.  She states that she was trying to 

help her neighbors out by bringing their cat in from outside yesterday.  The cat

bit her right hand and scratched her right calf.  She came the ER last night and

was sent home with oral antibiotics.  The patient woke up this morning and 

noticed increased swelling and redness in the hand.  She decided to come back to

the ER for reevaluation.  She is being admitted for IV antibiotics and 

orthopedic evaluation.  Infectious disease has been consulted as well.  She 

denies fever and chills.  There is swelling and warmth to the hand.  No drainage

or purulence. 





Review of Systems


Constitutional: Denies chills, Denies fatigue, Denies fever


Cardiovascular: Denies chest pain, Denies shortness of breath


Respiratory: Denies cough


Gastrointestinal: Denies diarrhea, Denies nausea, Denies vomiting


Musculoskeletal: right: hand pain, hand stiffness, hand swelling





Past Medical History


Past Medical History: No Reported History


History of Any Multi-Drug Resistant Organisms: None Reported


Past Surgical History: No Surgical Hx Reported


Past Psychological History: No Psychological Hx Reported


Smoking Status: Never smoker


Past Alcohol Use History: None Reported


Past Drug Use History: None Reported





Medications and Allergies


                                Home Medications











 Medication  Instructions  Recorded  Confirmed  Type


 


Amoxic-Pot Clav 875-125Mg 1 tab PO Q12H 04/03/23 04/03/23 History





[Augmentin 875-125]    


 


Cetirizine HCl [Zyrtec] 10 mg PO DAILY 04/03/23 04/03/23 History


 


Ibuprofen 600 mg PO Q6H PRN #20 tab 04/03/23 04/03/23 Rx


 


SUMAtriptan succinate [Imitrex] 25 mg PO BID PRN 04/03/23 04/03/23 History


 


norethindrone-e.estradioL-iron 1 tab PO HS 04/03/23 04/03/23 History





[Francis 24 Fe 1 mg-20 Mcg Tablet]    








                                    Allergies











Allergy/AdvReac Type Severity Reaction Status Date / Time


 


No Known Allergies Allergy   Verified 04/03/23 11:22














Physical Examination





The patient is a 42 y/o female in no acute distress.  She is alerted and 

oriented x3.  Exam of the right hand reveals swelling and erythema to the dorsal

aspect of the hand, worse in the area of the right middle finger MP joint.  

There are bite marks to the radial and ulnar aspect of the right middle finger 

MP joint.  There are other smaller areas of bites and scratches there appear 

benign at this time.  There is stiffness to the fingers and mild tenderness to 

the dorsum of the hand.  No drainage or fluctuance/fluid collection noted.  No 

abscess suspected.  No proximal red streaking.  There is an area marked 

previously and her erythema has receded since the area was marked. Neurological 

and circulatory status is intact.  





Results





x-rays of the right hand reveal soft tissue swelling to the dorsal aspect of the

hand.





- Labs


Labs: 


                  Abnormal Lab Results - Last 24 Hours (Table)











  04/03/23 04/03/23 04/03/23 Range/Units





  09:48 09:48 10:38 


 


WBC    13.5 H  (3.8-10.6)  k/uL


 


Neutrophils #    11.9 H  (1.3-7.7)  k/uL


 


Lymphocytes #    0.9 L  (1.0-4.8)  k/uL


 


Carbon Dioxide  19 L    (22-30)  mmol/L


 


Plasma Lactic Acid Lalo   2.2 H*   (0.7-2.0)  mmol/L


 


C-Reactive Protein  2.3 H    (<1.0)  mg/dL








                                      H & H











  04/03/23 Range/Units





  10:38 


 


Hgb  14.4  (11.4-16.0)  gm/dL


 


Hct  41.3  (34.0-46.0)  %











Result Diagrams: 


                                 04/03/23 10:38





                                 04/03/23 09:48





Assessment and Plan


(1) Cat bite of right hand


Current Visit: Yes   Status: Acute   Code(s): S61.451A - OPEN BITE OF RIGHT 

HAND, INITIAL ENCOUNTER; W55.01XA - BITTEN BY CAT, INITIAL ENCOUNTER   SNOMED Co

de(s): [id number]


   


Plan: 





The clinical and x-ray findings were discussed with the patient.  The case was 

discussed with Dr. Blue.  She will continue IV antibiotics per internal 

medicine and infectious disease.  No fluid collection was found and no surgical 

intervention is needed at this time.  I encouraged gentle ROM of the fingers as 

tolerated. We will continue monitor the patient closely.

## 2023-04-03 NOTE — XR
EXAMINATION TYPE: XR hand complete RT

 

DATE OF EXAM: 4/3/2023

 

COMPARISON: NONE

 

HISTORY: 43-year-old female with cat bite

 

TECHNIQUE: 3 views

 

FINDINGS: Prominent dorsal sided soft tissue swelling. No retained radiopaque foreign body. No acute 
fracture, subluxation, or dislocation.

 

IMPRESSION:

Prominent dorsal sided soft tissue swelling. No acute osseous abnormality seen.

## 2023-04-04 LAB
ANION GAP SERPL CALC-SCNC: 11.8 MMOL/L (ref 10–18)
BASOPHILS # BLD AUTO: 0.03 X 10*3/UL (ref 0–0.1)
BASOPHILS NFR BLD AUTO: 0.3 %
BUN SERPL-SCNC: 11.3 MG/DL (ref 9–27)
BUN/CREAT SERPL: 14.13 RATIO (ref 12–20)
CALCIUM SPEC-MCNC: 8.9 MG/DL (ref 8.7–10.3)
CHLORIDE SERPL-SCNC: 106 MMOL/L (ref 96–109)
CO2 SERPL-SCNC: 23.2 MMOL/L (ref 20–27.5)
EOSINOPHIL # BLD AUTO: 0.07 X 10*3/UL (ref 0.04–0.35)
EOSINOPHIL NFR BLD AUTO: 0.7 %
ERYTHROCYTE [DISTWIDTH] IN BLOOD BY AUTOMATED COUNT: 4.43 X 10*6/UL (ref 4.1–5.2)
ERYTHROCYTE [DISTWIDTH] IN BLOOD: 12.5 % (ref 11.5–14.5)
GLUCOSE SERPL-MCNC: 94 MG/DL (ref 70–110)
HCT VFR BLD AUTO: 42 % (ref 37.2–46.3)
HGB BLD-MCNC: 13.7 G/DL (ref 12–15)
IMM GRANULOCYTES BLD QL AUTO: 0.3 %
LYMPHOCYTES # SPEC AUTO: 1.66 X 10*3/UL (ref 0.9–5)
LYMPHOCYTES NFR SPEC AUTO: 15.9 %
MCH RBC QN AUTO: 30.9 PG (ref 27–32)
MCHC RBC AUTO-ENTMCNC: 32.6 G/DL (ref 32–37)
MCV RBC AUTO: 94.8 FL (ref 80–97)
MONOCYTES # BLD AUTO: 0.7 X 10*3/UL (ref 0.2–1)
MONOCYTES NFR BLD AUTO: 6.7 %
NEUTROPHILS # BLD AUTO: 7.94 X 10*3/UL (ref 1.8–7.7)
NEUTROPHILS NFR BLD AUTO: 76.1 %
NRBC BLD AUTO-RTO: 0 /100 WBCS (ref 0–0)
PLATELET # BLD AUTO: 213 X 10*3/UL (ref 140–440)
POTASSIUM SERPL-SCNC: 4.1 MMOL/L (ref 3.5–5.5)
SODIUM SERPL-SCNC: 141 MMOL/L (ref 135–145)
WBC # BLD AUTO: 10.43 X 10*3/UL (ref 4.5–10)

## 2023-04-04 RX ADMIN — KETOROLAC TROMETHAMINE SCH MG: 15 INJECTION, SOLUTION INTRAMUSCULAR; INTRAVENOUS at 19:15

## 2023-04-04 RX ADMIN — LORATADINE SCH: 10 TABLET ORAL at 09:07

## 2023-04-04 RX ADMIN — AMPICILLIN SODIUM AND SULBACTAM SODIUM SCH MLS/HR: 2; 1 INJECTION, POWDER, FOR SOLUTION INTRAMUSCULAR; INTRAVENOUS at 09:07

## 2023-04-04 RX ADMIN — AMPICILLIN SODIUM AND SULBACTAM SODIUM SCH MLS/HR: 2; 1 INJECTION, POWDER, FOR SOLUTION INTRAMUSCULAR; INTRAVENOUS at 00:47

## 2023-04-04 RX ADMIN — ASPIRIN SCH: 325 TABLET ORAL at 20:44

## 2023-04-04 RX ADMIN — AMPICILLIN SODIUM AND SULBACTAM SODIUM SCH MLS/HR: 2; 1 INJECTION, POWDER, FOR SOLUTION INTRAMUSCULAR; INTRAVENOUS at 14:47

## 2023-04-04 RX ADMIN — PANTOPRAZOLE SODIUM SCH MG: 40 INJECTION, POWDER, FOR SOLUTION INTRAVENOUS at 14:48

## 2023-04-04 RX ADMIN — AMPICILLIN SODIUM AND SULBACTAM SODIUM SCH MLS/HR: 2; 1 INJECTION, POWDER, FOR SOLUTION INTRAMUSCULAR; INTRAVENOUS at 20:44

## 2023-04-04 RX ADMIN — IBUPROFEN PRN MG: 400 TABLET, FILM COATED ORAL at 09:25

## 2023-04-04 RX ADMIN — KETOROLAC TROMETHAMINE PRN MG: 15 INJECTION, SOLUTION INTRAMUSCULAR; INTRAVENOUS at 09:15

## 2023-04-04 NOTE — P.PN
Progress Note - Text


Progress Note Date: 04/04/23





The patient is presently admitted to  with cellulitis to her hand following a 

cat bite. Orthopaedics was consulted in regards to her hand infection. The 

patient's  is a  and has requested a hand surgeon manage his wife 

as his sister is a hand therapist and has recommended that her care be under a 

hand surgeon. Our office's hand surgeon, Dr. Marcial, is out of town for the week. I

am a fellowship-trained total hip and knee surgeon and not a hand surgeon. Since

the family has made the request for a hand surgeon I will sign off at this time.

I would recommend consulting Dr. Cardenas, a fellowship trained hand surgeon for 

[facility]. If Dr. Cardenas is unavailable to see the patient, I would 

recommend transferring the patient to a facility where a hand surgeon is 

available. We will sign off at this time.

## 2023-04-04 NOTE — P.CNOR
History of Present Illness





- [facility]


Consult date: 04/04/23


Consult reason: other (Right hand cat bite)


History of present illness: 





This is a 43-year-old female who presented to the emergency department initially

with complaints of right hand swelling after she was bit on the dorsal aspect of

her right hand by her neighbors cat on Sunday, 04/02/2023.  She was seen rita dickerson in the emergency department and was placed on oral antibiotics and sent 

home.  She then returned the next day due to increased swelling and pain.  She 

was admitted for IV antibiotics.  She states that her swelling has gone down in 

the last 24 hours and she has noticed improvement in her pain.  She still 

complains of some stiffness and inability to make a full fist.  She denies any 

paresthesias.  She denies any prior injury to this hand in the past.





Past Medical History


Past Medical History: No Reported History


History of Any Multi-Drug Resistant Organisms: None Reported


Past Surgical History: No Surgical Hx Reported


Past Psychological History: No Psychological Hx Reported


Smoking Status: Never smoker


Past Alcohol Use History: None Reported


Past Drug Use History: None Reported





Medications and Allergies


                                Home Medications











 Medication  Instructions  Recorded  Confirmed  Type


 


Amoxic-Pot Clav 875-125Mg 1 tab PO Q12H 04/03/23 04/03/23 History





[Augmentin 875-125]    


 


Cetirizine HCl [Zyrtec] 10 mg PO DAILY 04/03/23 04/03/23 History


 


Ibuprofen 600 mg PO Q6H PRN #20 tab 04/03/23 04/03/23 Rx


 


SUMAtriptan succinate [Imitrex] 25 mg PO BID PRN 04/03/23 04/03/23 History


 


norethindrone-e.estradioL-iron 1 tab PO HS 04/03/23 04/03/23 History





[Francis 24 Fe 1 mg-20 Mcg Tablet]    








                                    Allergies











Allergy/AdvReac Type Severity Reaction Status Date / Time


 


No Known Allergies Allergy   Verified 04/03/23 11:22














Physical Examination


Osteopathic Statement: *.  No significant issues noted on an osteopathic 

structural exam other than those noted in the History and Physical/Consult.





Results





- Labs


Labs: 


                  Abnormal Lab Results - Last 24 Hours (Table)











  04/04/23 Range/Units





  04:04 


 


WBC  10.43 H  (4.50-10.00)  X 10*3/uL


 


Neutrophils #  7.94 H  (1.80-7.70)  X 10*3/uL








                      Microbiology - Last 24 Hours (Table)











 04/03/23 09:48 Blood Culture - Preliminary





 Blood    No Growth after 24 hours


 


 04/03/23 09:48 Blood Culture - Preliminary





 Blood    No Growth after 24 hours








                                      H & H











  04/03/23 04/04/23 Range/Units





  10:38 04:04 


 


Hgb  14.4  13.7  (11.4-16.0)  gm/dL


 


Hct  41.3  42.0  (34.0-46.0)  %











Result Diagrams: 


                                 04/04/23 04:04





                                 04/04/23 04:04





Assessment and Plan


Assessment: 


Physical Exam:





RUE: AIN/PIN/Radial/Ulnar/Median motor intact. Radial/Ulnar/Median SILT. 2+/4 

Radial/Ulnar pulses palpated. 5/5 APB, 5/5 FDI. Negative Finkelsteins, negative 

CMC grind, negative Durkan's compression. Redness/ swelling and warmth present 

over dorsal hand. 2 puncture wounds located at the radial and ulnar portions of 

the metacarpal head, no direct puncture wound over the MCP joint of the middle 

finger. Passive MCP ROM 0-115 of all digits without pain. NTTP over volar 

palm/flexor tendon sheath. No fusiform swelling or pain with passive extension 

of the fingers. Able to make a partial fist with minimal pain. 





Assessment:





1.) Right hand cat bite with surrounding dorsal hand cellulitis.





Plan:





I was able to see and examine the patient myself.  Currently there are no signs 

of abscess on physical exam or CT of the hand.  She has no signs of pyogenic 

flexor tenosynovitis and the swelling/cellulitic changes are mainly located at 

the dorsal aspect of the hand and slowly improving.  There are no signs of 

septic arthritis in the MCP joint.  I recommend continued IV antibiotics over 

the next 24 hours.  If she continues to respond well to antibiotics we discussed

she can be discharged on oral antibiotic therapy and follow up in office in 7-10

days.  The patient and  who was at bedside during the patient encounter 

were agreeable with this plan of action.





-Domenico Cardenas 


Orthopedic Hand/Upper Extremity Surgeon

## 2023-04-04 NOTE — PN
PROGRESS NOTE



DATE OF SERVICE:  04/04/2023



SUBJECTIVE:

This 43-year-old woman was admitted with a cat bite on the right hand, had some

increased swelling today.  CAT scan of the hand showed soft tissue edema without any

evidence of abscess.



OBJECTIVE:

VITAL SIGNS:  Pulse is 66, blood pressure 111/70, respirations 16.

CHEST:  Clear to auscultation.

CARDIOVASCULAR:  S1, S2 muffled.

ABDOMEN:  Soft.

EXTREMITIES:  Right arm pain and swelling and tenderness with erythema extending up the

arm also present.



LABORATORY DATA:

Noted.  Lactic acid is normal.



ASSESSMENT:

1. Cat bite with acute severe cellulitis of the right hand.

2. Severe pain.

3. Failure of outpatient treatment.

4. Elevated lactic acid, improved.



RECOMMENDATIONS:

Recommend to continue current management, continue symptomatic treatment.  Continue

with broad-spectrum IV antibiotics.  Continue the cultures.  Infectious Disease and

Hand surgery/orthopedic evaluation.  Guarded prognosis.  Further recommendations to

follow.





MMODL / IJN: [id number] / Job#: [id number]

## 2023-04-04 NOTE — P.CONS
History of Present Illness





- Reason for Consult


Consult date: 04/04/23


Cat bite to right hand


Requesting physician: Janey Schaffer





- Chief Complaint


Right hand swelling and redness x one day





- History of Present Illness


Patient is a 43-year-old  female with no significant past medical 

history presenting to the ER yesterday morning for evaluation of right hand pain

swelling and redness patient's symptoms started around 6 in the morning and 

apparently the patient has been bitten by her cat the night before on the right 

hand patient noticed to have increasing swelling and redness on the dorsal 

aspect of the right hand extending to the right forearm and elbow area patient 

was describing the pain to be throbbing intensity is almost 7-8 out of 10 

radiation with associated swelling redness no open wound or any drainage patient

on presentation to the hospital was afebrile and no fever has been recorded 

subsequently patient did have white count of 13.5 with a left shift kidney 

function has been normal liver exams are normal lactic acid was 2.2 patient did 

have a x-ray of the hand prominent dorsal sided soft tissue swelling no acute 

bony abnormality patient was started on Unasyn infectious disease was consulted 

for further management of antibiotic therapy patient has been evaluated by 

orthopedics and CT of the head has been ordered which is currently pending








Review of Systems


Positive point has been  mentioned in the HPI rest of the systems are negative








Past Medical History


Past Medical History: No Reported History


History of Any Multi-Drug Resistant Organisms: None Reported


Past Surgical History: No Surgical Hx Reported


Past Psychological History: No Psychological Hx Reported


Smoking Status: Never smoker


Past Alcohol Use History: None Reported


Past Drug Use History: None Reported





Medications and Allergies


                                Home Medications











 Medication  Instructions  Recorded  Confirmed  Type


 


Amoxic-Pot Clav 875-125Mg 1 tab PO Q12H 04/03/23 04/03/23 History





[Augmentin 875-125]    


 


Cetirizine HCl [Zyrtec] 10 mg PO DAILY 04/03/23 04/03/23 History


 


Ibuprofen 600 mg PO Q6H PRN #20 tab 04/03/23 04/03/23 Rx


 


SUMAtriptan succinate [Imitrex] 25 mg PO BID PRN 04/03/23 04/03/23 History


 


norethindrone-e.estradioL-iron 1 tab PO HS 04/03/23 04/03/23 History





[Francis 24 Fe 1 mg-20 Mcg Tablet]    








                                    Allergies











Allergy/AdvReac Type Severity Reaction Status Date / Time


 


No Known Allergies Allergy   Verified 04/03/23 11:22














Physical Exam


Vitals: 


                                   Vital Signs











  Temp Pulse Pulse Resp BP BP Pulse Ox


 


 04/04/23 06:46  98.5 F   66  16   111/71  97


 


 04/04/23 01:39     16   


 


 04/04/23 00:46  98.2 F   73  16   106/65  98


 


 04/03/23 19:25   75   18  140/80   98


 


 04/03/23 14:32   83   16  141/81   97








                                Intake and Output











 04/03/23 04/04/23 04/04/23





 22:59 06:59 14:59


 


Intake Total 100 500 


 


Balance 100 500 


 


Intake:   


 


  Intake, IV Titration 100  





  Amount   


 


    Ampicillin-Sulbactam 3 gm 100  





    In Sodium Chloride 0.9%   





    100 ml @ 200 mls/hr IVPB   





    Q6H [facility] Rx#:[id number]   


 


  Oral  500 


 


Other:   


 


  Voiding Method  Toilet 


 


  Weight 64.864 kg  











GENERAL DESCRIPTION: Middle-aged female lying in bed, no distress. No tachypnea 

or accessory muscle of respiration use.


HEENT: Shows Pallor , no scleral icterus. Oral mucous membrane is dry. 


NECK: Trachea central, no thyromegaly.


LUNGS: Unlabored breathing. Clear to auscultation anteriorly. No wheeze or 

crackle.


HEART: S1, S2, regular rate and rhythm. No loud murmur


ABDOMEN: Soft, no tenderness , guarding or rigidity, no organomegaly


EXTREMITIES: Right hand dorsum did have swelling and redness tender and warm to 

touch no open wound or drainage


SKIN: No rash, no masses palpable.


NEUROLOGICAL: The patient is awake, alert, oriented x3, mood and affect normal.

















Results


CBC & Chem 7: 


                                 04/05/23 06:01





                                 04/05/23 06:01


Labs: 


                  Abnormal Lab Results - Last 24 Hours (Table)











  04/04/23 Range/Units





  04:04 


 


WBC  10.43 H  (4.50-10.00)  X 10*3/uL


 


Neutrophils #  7.94 H  (1.80-7.70)  X 10*3/uL














Assessment and Plan


(1) Cat bite of right hand


Current Visit: Yes   Status: Acute   Code(s): S61.451A - OPEN BITE OF RIGHT 

HAND, INITIAL ENCOUNTER; W55.01XA - BITTEN BY CAT, INITIAL ENCOUNTER   SNOMED 

Code(s): 556115098


   





(2) Pasteurella cellulitis due to cat bite


Current Visit: Yes   Status: Acute   Code(s): L03.90 - CELLULITIS, UNSPECIFIED; 

A28.0 - PASTEURELLOSIS; W55.01XA - BITTEN BY CAT, INITIAL ENCOUNTER   SNOMED 

Code(s): 101481274


   


Plan: 


1patient was in the hospital with right hand cat bite cellulitis in this 

patient refused swelling redness of the right hand with some extension to the 

dorsal aspect of the right hand in the elbow area likely representing 

lymphangitis and will need to cover for the polymicrobial nash of the Mouth 

clinically not behaving as an abscess awaiting CT


2-patient to continue with Unasyn 3 g every 6 hours


Patient and  has multiple questions concerns were answered in layman term


We will follow on clinical condition and cultures to further adjust medication 

if needed


Thank you for this consultation we will follow the patient along with you





Time with Patient: Greater than 30

## 2023-04-04 NOTE — CT
EXAMINATION TYPE: CT hand RT w con

CT DLP: 144.6 mGycm, Automated exposure control for dose reduction was used.

 

DATE OF EXAM: 4/4/2023 11:50 AM

 

COMPARISON: Right hand radiograph for 323

 

CLINICAL INDICATION:Female, 43 years old with history of cat bite; PHH, right hand pain and swelling 
following dog bite

 

TECHNIQUE: Axial images were obtained of the right hand after the uneventful administration 100 cc of
 Isovue-300 intravenously.  Additional coronal and sagittal reformatted images and soft tissue and shadia
ne window were obtained for review. 3-D reconstruction was created on a separate workstation. 

 

FINDINGS: There is no evidence of fracture, subluxation, or dislocation. No osseous erosions. No sign
ificant joint effusion identified. There is moderate soft tissue edema of the dorsum of the hand over
lying the carpals and metacarpals without organized fluid collection. Vasculature appears patent. No 
focal muscular atrophy. No radiopaque foreign body identified.

 

 

IMPRESSION: Moderate soft tissue edema of the dorsum of the hand without evidence of abscess. No radi
opaque foreign body identified or osseous erosions. No acute fracture or dislocation.

## 2023-04-04 NOTE — P.PN
Subjective


Progress Note Date: 04/04/23


This patient is a 43-year-old female that orthopedic surgery is following for 

cellulitis of the right hand following a cat bite. 


Patient is examined bedside this morning.  She states she believes her right 

hand pain is worse.  She notes increased swelling and pain in the hand with 

radiation to the wrist. The patient is currently receiving IV Unasyn. Patient 

states she otherwise feels well and denies fevers, chills, nausea, vomiting.








Objective





- Vital Signs


Vital signs: 


                                   Vital Signs











Temp  98.5 F   04/04/23 06:46


 


Pulse  66   04/04/23 06:46


 


Resp  16   04/04/23 06:46


 


BP  111/71   04/04/23 06:46


 


Pulse Ox  97   04/04/23 06:46


 


FiO2      








                                 Intake & Output











 04/03/23 04/04/23 04/04/23





 18:59 06:59 18:59


 


Intake Total  600 


 


Balance  600 


 


Weight 64.864 kg 64.864 kg 


 


Intake:   


 


  Intake, IV Titration  100 





  Amount   


 


    Ampicillin-Sulbactam 3 gm  100 





    In Sodium Chloride 0.9%   





    100 ml @ 200 mls/hr IVPB   





    Q6H [facility] Rx#:[id number]   


 


  Oral  500 


 


Other:   


 


  Voiding Method  Toilet 














- Exam


On examination, patient is sitting up in bed in no acute distress.  She is alert

and orientated 3.  On inspection of the right hand, there is diffuse swelling 

and erythema to the dorsal aspect of the hand.  There are 2 bite marks at the 

dorsal right middle finger MCP joint.  There is no drainage.  There is no area 

of fluctuance or fluid collection noted on exam.  There is swelling and 

stiffness into the fingers, although there is minimal pain with passive range of

motion of the fingers and thumb. No pain with passive range of motion of the 

wrist or elbow. Neurological and circulatory status is intact.  








- Labs


CBC & Chem 7: 


                                 04/04/23 04:04





                                 04/04/23 04:04


Labs: 


                  Abnormal Lab Results - Last 24 Hours (Table)











  04/04/23 Range/Units





  04:04 


 


WBC  10.43 H  (4.50-10.00)  X 10*3/uL


 


Neutrophils #  7.94 H  (1.80-7.70)  X 10*3/uL














Assessment and Plan


Assessment: 


Right hand cellulitis status-post cat bite





Plan: 


- Patient was discussed with Dr. Braaksma.  Due to her worsening symptoms 

overnight, recommend CT scan of the right hand with contrast to rule-out 

abscess. 


- IV antibiotics per infectious disease. 


- Further recommendations following CT scan.

## 2023-04-05 LAB
ANION GAP SERPL CALC-SCNC: 6 MMOL/L
BASOPHILS # BLD AUTO: 0 K/UL (ref 0–0.2)
BASOPHILS NFR BLD AUTO: 0 %
BUN SERPL-SCNC: 9 MG/DL (ref 7–17)
CALCIUM SPEC-MCNC: 8.6 MG/DL (ref 8.4–10.2)
CHLORIDE SERPL-SCNC: 108 MMOL/L (ref 98–107)
CO2 SERPL-SCNC: 27 MMOL/L (ref 22–30)
EOSINOPHIL # BLD AUTO: 0.1 K/UL (ref 0–0.7)
EOSINOPHIL NFR BLD AUTO: 2 %
ERYTHROCYTE [DISTWIDTH] IN BLOOD BY AUTOMATED COUNT: 4.27 M/UL (ref 3.8–5.4)
ERYTHROCYTE [DISTWIDTH] IN BLOOD: 12.1 % (ref 11.5–15.5)
GLUCOSE SERPL-MCNC: 94 MG/DL (ref 74–99)
HCT VFR BLD AUTO: 39.8 % (ref 34–46)
HGB BLD-MCNC: 13.3 GM/DL (ref 11.4–16)
LYMPHOCYTES # SPEC AUTO: 1.5 K/UL (ref 1–4.8)
LYMPHOCYTES NFR SPEC AUTO: 19 %
MCH RBC QN AUTO: 31.2 PG (ref 25–35)
MCHC RBC AUTO-ENTMCNC: 33.5 G/DL (ref 31–37)
MCV RBC AUTO: 93.2 FL (ref 80–100)
MONOCYTES # BLD AUTO: 0.4 K/UL (ref 0–1)
MONOCYTES NFR BLD AUTO: 5 %
NEUTROPHILS # BLD AUTO: 5.6 K/UL (ref 1.3–7.7)
NEUTROPHILS NFR BLD AUTO: 72 %
PLATELET # BLD AUTO: 185 K/UL (ref 150–450)
POTASSIUM SERPL-SCNC: 4.1 MMOL/L (ref 3.5–5.1)
SODIUM SERPL-SCNC: 141 MMOL/L (ref 137–145)
WBC # BLD AUTO: 7.8 K/UL (ref 3.8–10.6)

## 2023-04-05 RX ADMIN — AMPICILLIN SODIUM AND SULBACTAM SODIUM SCH MLS/HR: 2; 1 INJECTION, POWDER, FOR SOLUTION INTRAMUSCULAR; INTRAVENOUS at 21:06

## 2023-04-05 RX ADMIN — KETOROLAC TROMETHAMINE SCH MG: 15 INJECTION, SOLUTION INTRAMUSCULAR; INTRAVENOUS at 12:49

## 2023-04-05 RX ADMIN — PANTOPRAZOLE SODIUM SCH MG: 40 INJECTION, POWDER, FOR SOLUTION INTRAVENOUS at 08:21

## 2023-04-05 RX ADMIN — KETOROLAC TROMETHAMINE SCH MG: 15 INJECTION, SOLUTION INTRAMUSCULAR; INTRAVENOUS at 00:48

## 2023-04-05 RX ADMIN — KETOROLAC TROMETHAMINE SCH MG: 15 INJECTION, SOLUTION INTRAMUSCULAR; INTRAVENOUS at 18:14

## 2023-04-05 RX ADMIN — AMPICILLIN SODIUM AND SULBACTAM SODIUM SCH MLS/HR: 2; 1 INJECTION, POWDER, FOR SOLUTION INTRAMUSCULAR; INTRAVENOUS at 00:50

## 2023-04-05 RX ADMIN — LORATADINE SCH: 10 TABLET ORAL at 08:13

## 2023-04-05 RX ADMIN — AMPICILLIN SODIUM AND SULBACTAM SODIUM SCH MLS/HR: 2; 1 INJECTION, POWDER, FOR SOLUTION INTRAMUSCULAR; INTRAVENOUS at 13:48

## 2023-04-05 RX ADMIN — KETOROLAC TROMETHAMINE SCH MG: 15 INJECTION, SOLUTION INTRAMUSCULAR; INTRAVENOUS at 06:28

## 2023-04-05 RX ADMIN — AMPICILLIN SODIUM AND SULBACTAM SODIUM SCH MLS/HR: 2; 1 INJECTION, POWDER, FOR SOLUTION INTRAMUSCULAR; INTRAVENOUS at 08:21

## 2023-04-05 RX ADMIN — ASPIRIN SCH: 325 TABLET ORAL at 20:30

## 2023-04-05 NOTE — PN
PROGRESS NOTE



DATE OF SERVICE:  04/05/2023



SUBJECTIVE:

This is a 43-year-old woman who was admitted with a cat bite and severe cellulitis, is

being closely monitored.  The swelling and pain are improving.  Hand surgeon as well as

Infectious Disease saw the patient.  Cultures are negative so far.



OBJECTIVE:

VITAL SIGNS: Pulse is 69, blood pressure 103/69, respirations 17.

CHEST:  Clear to auscultation.

CARDIOVASCULAR: S1, S2.

ABDOMEN:  Soft.

EXTREMITIES: Right hand swelling and tenderness and some limitation of movements, which

has much improved.



LABORATORY DATA:

Reviewed.



ASSESSMENT:

1. Cat bite with acute severe cellulitis of the right hand.

2. Severe pain, improving.

3. Failure of outpatient treatment.

4. Elevated lactic acid, improved.



RECOMMENDATIONS AND DISCUSSION:

Recommend to continue current management and continue symptomatic treatment. Otherwise,

continue with IV antibiotics.  Closely follow with multiple consultants.  Continue with

pain management.  Further recommendations to follow.





MMODL / IJN: [id number] / Job#: [id number]

## 2023-04-05 NOTE — P.PN
Subjective


Progress Note Date: 04/05/23


Principal diagnosis: 


cellulitis right hand





Patient was seen at bedside this afternoon sitting up in chair currently on IV 

antibiotics.  Patient feels that the swelling and redness in her right hand has 

been improving since yesterday.  Patient says that infectious disease would like

to keep her another day for IV antibiotics.  Patient is hoping to go home soon. 

Patient says she does have a little bit of pain in between the second and third 

MCPJ on the right dorsum, however, overall the pain has been improving over the 

past couple days.  Patient denies chest pain, fever, shortness breath, nausea, 

vomiting, change in vision, loss of bowel/bladder control








Objective





- Vital Signs


Vital signs: 


                                   Vital Signs











Temp  98.1 F   04/05/23 06:33


 


Pulse  60   04/05/23 06:33


 


Resp  16   04/05/23 06:33


 


BP  114/73   04/05/23 06:33


 


Pulse Ox  98   04/05/23 06:33


 


FiO2      








                                 Intake & Output











 04/04/23 04/05/23 04/05/23





 18:59 06:59 18:59


 


Intake Total 240 500 0


 


Balance 240 500 0


 


Intake:   


 


  Oral 240 500 0


 


Other:   


 


  Voiding Method  Toilet 


 


  # Voids 1 1 














- Exam


 AIN/PIN/Radial/Ulnar/Median motor intact. Radial/Ulnar/Median SILT. 2+/4 

Radial/Ulnar pulses palpated. 5/5 APB, 5/5 FDI. Negative Finkelsteins, negative 

CMC grind, negative Durkan's compression. Redness/ swelling and warmth present 

over dorsal hand, improving. 2 puncture wounds located at the radial and ulnar 

portions of the metacarpal head, no direct puncture wound over the MCP joint of 

the middle finger. Passive MCP ROM 0-115 of all digits without pain. NTTP over 

volar palm/flexor tendon sheath. No fusiform swelling or pain with passive 

extension of the fingers. Able to make a partial fist with minimal pain. 











- Labs


CBC & Chem 7: 


                                 04/05/23 06:01





                                 04/05/23 06:01


Labs: 


                  Abnormal Lab Results - Last 24 Hours (Table)











  04/05/23 Range/Units





  06:01 


 


Chloride  108 H  ()  mmol/L








                      Microbiology - Last 24 Hours (Table)











 04/03/23 09:48 Blood Culture - Preliminary





 Blood    No Growth after 48 hours


 


 04/03/23 09:48 Blood Culture - Preliminary





 Blood    No Growth after 48 hours














Assessment and Plan


Assessment: 


1.  Right hand cat bite with surrounding dorsal hand cellulitis





Plan: 


1.  Right hand cat bite with surrounding dorsal hand cellulitis - CT right hand 

negative for any abscess/tendon rupture.  Patient symptomatically seems to be 

improving.  Erythema and swelling are decreasing in the dorsum of the right 

hand. WBC trending down Patient is currently being seen by medicine and 

infectious disease.  Patient is on IV antibiotics currently.  At this time we 

are not recommending any emergent/urgent orthopedic surgical intervention.  Pain

medication as needed.  We do recommend patient to follow-up in the outpatient 

setting with Dr. Cardenas for further evaluation.  At this time orthopedics is 

signing off.  Please do not hesitate to contact us for any further questions.





2.  Appreciate medical and infectious disease management





3.  Pain management - Tylenol; Toradol; Norco





4.  GI prophylaxis - Protonix





5.  DVT ppx recs





6.  PT/OT - WBAT





7. Appreciate consult





Time with Patient: Less than 30

## 2023-04-05 NOTE — P.PN
Subjective


Progress Note Date: 04/05/23


Principal diagnosis: 


Right hand cat bite cellulitis





Patient is a 43-year-old  female presenting to the hospital with right 

hand dorsum swelling and redness after cat bite, patient did have a CT of the 

right hand that was negative for any abscess and has been evaluated by the hand 

surgeon recommending no surgical intervention.


On today's evaluation that is 04/05/2023, the patient denies having any fever or

any chills right hand dorsum swelling redness has decreased still complaining of

some swelling to the right forearm area but no redness no bone wound or any 

drainage no chest pain shortness of breath or cough and no diarrhea








Objective





- Vital Signs


Vital signs: 


                                   Vital Signs











Temp  98.1 F   04/05/23 06:33


 


Pulse  60   04/05/23 06:33


 


Resp  16   04/05/23 06:33


 


BP  114/73   04/05/23 06:33


 


Pulse Ox  98   04/05/23 06:33


 


FiO2      








                                 Intake & Output











 04/04/23 04/05/23 04/05/23





 18:59 06:59 18:59


 


Intake Total 240 500 0


 


Balance 240 500 0


 


Intake:   


 


  Oral 240 500 0


 


Other:   


 


  Voiding Method  Toilet 


 


  # Voids 1 1 














- Exam


GENERAL DESCRIPTION: Middle-age female up in the chair in no distress





RESPIRATORY SYSTEM: Unlabored breathing , clear to auscultation anteriorly





HEART: S1 S2 regular rate and rhythm ,








EXTREMITIES: Right hand dorsum swelling redness has decreased no drainage








- Labs


CBC & Chem 7: 


                                 04/05/23 06:01





                                 04/05/23 06:01


Labs: 


                  Abnormal Lab Results - Last 24 Hours (Table)











  04/05/23 Range/Units





  06:01 


 


Chloride  108 H  ()  mmol/L








                      Microbiology - Last 24 Hours (Table)











 04/03/23 09:48 Blood Culture - Preliminary





 Blood    No Growth after 48 hours


 


 04/03/23 09:48 Blood Culture - Preliminary





 Blood    No Growth after 48 hours














Assessment and Plan


(1) Pasteurella cellulitis due to cat bite


Current Visit: Yes   Status: Acute   Code(s): L03.90 - CELLULITIS, UNSPECIFIED; 

A28.0 - PASTEURELLOSIS; W55.01XA - BITTEN BY CAT, INITIAL ENCOUNTER   SNOMED 

Code(s): 328140645


   





(2) Cat bite of right hand


Current Visit: Yes   Status: Acute   Code(s): S61.451A - OPEN BITE OF RIGHT 

HAND, INITIAL ENCOUNTER; W55.01XA - BITTEN BY CAT, INITIAL ENCOUNTER   SNOMED 

Code(s): 369895365


   


Plan: 


1patient was in the hospital with right hand cat bite cellulitis in this 

patient refused swelling redness of the right hand with some extension to the d

orsal aspect of the right hand in the elbow area likely representing 

lymphangitis and will need to cover for the polymicrobial nash of the Mouth 

clinically not behaving as an abscess, CT was negative for any drainable abscess


2-patient seemed to have her clinical improvement and will continue with Unasyn 

3 g every 6 hours for another 24-48 hours depending upon clinical response 

before transitioning her to oral antibiotic





Time with Patient: Less than 30

## 2023-04-06 VITALS — TEMPERATURE: 97.7 F | SYSTOLIC BLOOD PRESSURE: 110 MMHG | DIASTOLIC BLOOD PRESSURE: 63 MMHG

## 2023-04-06 VITALS — RESPIRATION RATE: 18 BRPM | HEART RATE: 62 BPM

## 2023-04-06 LAB
ANION GAP SERPL CALC-SCNC: 10.9 MMOL/L (ref 10–18)
BASOPHILS # BLD AUTO: 0.03 X 10*3/UL (ref 0–0.1)
BASOPHILS NFR BLD AUTO: 0.5 %
BUN SERPL-SCNC: 8.9 MG/DL (ref 9–27)
BUN/CREAT SERPL: 11.13 RATIO (ref 12–20)
CALCIUM SPEC-MCNC: 8.7 MG/DL (ref 8.7–10.3)
CHLORIDE SERPL-SCNC: 108 MMOL/L (ref 96–109)
CO2 SERPL-SCNC: 25.1 MMOL/L (ref 20–27.5)
EOSINOPHIL # BLD AUTO: 0.15 X 10*3/UL (ref 0.04–0.35)
EOSINOPHIL NFR BLD AUTO: 2.4 %
ERYTHROCYTE [DISTWIDTH] IN BLOOD BY AUTOMATED COUNT: 4.11 X 10*6/UL (ref 4.1–5.2)
ERYTHROCYTE [DISTWIDTH] IN BLOOD: 12 % (ref 11.5–14.5)
GLUCOSE SERPL-MCNC: 89 MG/DL (ref 70–110)
HCT VFR BLD AUTO: 38.4 % (ref 37.2–46.3)
HGB BLD-MCNC: 12.7 G/DL (ref 12–15)
IMM GRANULOCYTES BLD QL AUTO: 0.2 %
LYMPHOCYTES # SPEC AUTO: 1.86 X 10*3/UL (ref 0.9–5)
LYMPHOCYTES NFR SPEC AUTO: 30.3 %
MCH RBC QN AUTO: 30.9 PG (ref 27–32)
MCHC RBC AUTO-ENTMCNC: 33.1 G/DL (ref 32–37)
MCV RBC AUTO: 93.4 FL (ref 80–97)
MONOCYTES # BLD AUTO: 0.6 X 10*3/UL (ref 0.2–1)
MONOCYTES NFR BLD AUTO: 9.8 %
NEUTROPHILS # BLD AUTO: 3.48 X 10*3/UL (ref 1.8–7.7)
NEUTROPHILS NFR BLD AUTO: 56.8 %
NRBC BLD AUTO-RTO: 0 /100 WBCS (ref 0–0)
PLATELET # BLD AUTO: 213 X 10*3/UL (ref 140–440)
POTASSIUM SERPL-SCNC: 4.2 MMOL/L (ref 3.5–5.5)
SODIUM SERPL-SCNC: 144 MMOL/L (ref 135–145)
WBC # BLD AUTO: 6.13 X 10*3/UL (ref 4.5–10)

## 2023-04-06 RX ADMIN — AMPICILLIN SODIUM AND SULBACTAM SODIUM SCH MLS/HR: 2; 1 INJECTION, POWDER, FOR SOLUTION INTRAMUSCULAR; INTRAVENOUS at 13:16

## 2023-04-06 RX ADMIN — LORATADINE SCH: 10 TABLET ORAL at 08:52

## 2023-04-06 RX ADMIN — IBUPROFEN PRN MG: 400 TABLET, FILM COATED ORAL at 05:42

## 2023-04-06 RX ADMIN — KETOROLAC TROMETHAMINE SCH MG: 15 INJECTION, SOLUTION INTRAMUSCULAR; INTRAVENOUS at 00:57

## 2023-04-06 RX ADMIN — KETOROLAC TROMETHAMINE SCH: 15 INJECTION, SOLUTION INTRAMUSCULAR; INTRAVENOUS at 11:27

## 2023-04-06 RX ADMIN — AMPICILLIN SODIUM AND SULBACTAM SODIUM SCH MLS/HR: 2; 1 INJECTION, POWDER, FOR SOLUTION INTRAMUSCULAR; INTRAVENOUS at 09:03

## 2023-04-06 RX ADMIN — AMPICILLIN SODIUM AND SULBACTAM SODIUM SCH MLS/HR: 2; 1 INJECTION, POWDER, FOR SOLUTION INTRAMUSCULAR; INTRAVENOUS at 00:57

## 2023-04-06 RX ADMIN — PANTOPRAZOLE SODIUM SCH MG: 40 INJECTION, POWDER, FOR SOLUTION INTRAVENOUS at 09:03

## 2023-04-06 RX ADMIN — KETOROLAC TROMETHAMINE SCH: 15 INJECTION, SOLUTION INTRAMUSCULAR; INTRAVENOUS at 05:43

## 2023-04-07 NOTE — P.PN
Subjective


Progress Note Date: 04/06/23


Principal diagnosis: 


Right hand cat bite cellulitis





Patient is a 43-year-old  female presenting to the hospital with right 

hand dorsum swelling and redness after cat bite, patient did have a CT of the 

right hand that was negative for any abscess and has been evaluated by the hand 

surgeon recommending no surgical intervention.


On today's evaluation that is 04/06/2023, the patient remains to be afebrile, 

the patient right hand dorsum swelling redness has decreased in intensity, which

is having any chest pain shortness of breath or cough and no diarrhea








Objective





- Vital Signs


Vital signs: 


                                   Vital Signs











Temp  97.7 F   04/06/23 07:54


 


Pulse  62   04/06/23 09:03


 


Resp  18   04/06/23 09:03


 


BP  110/63   04/06/23 07:54


 


Pulse Ox  99   04/06/23 07:54


 


FiO2      








                                 Intake & Output











 04/05/23 04/06/23 04/06/23





 18:59 06:59 18:59


 


Intake Total 500  240


 


Balance 500  240


 


Intake:   


 


  Oral 500  240


 


Other:   


 


  Voiding Method  Toilet Toilet


 


  # Voids 1 1 














- Exam


GENERAL DESCRIPTION: Middle-age female up in the chair in no distress





RESPIRATORY SYSTEM: Unlabored breathing , clear to auscultation anteriorly





HEART: S1 S2 regular rate and rhythm ,








EXTREMITIES: Right hand dorsum swelling redness has decreased








- Labs


CBC & Chem 7: 


                                 04/06/23 05:48





                                 04/06/23 05:48


Labs: 


                  Abnormal Lab Results - Last 24 Hours (Table)











  04/06/23 Range/Units





  05:48 


 


BUN  8.9 L  (9.0-27.0)  mg/dL


 


BUN/Creatinine Ratio  11.13 L  (12.00-20.00)  Ratio








                      Microbiology - Last 24 Hours (Table)











 04/03/23 09:48 Blood Culture - Preliminary





 Blood    No Growth after 72 hours


 


 04/03/23 09:48 Blood Culture - Preliminary





 Blood    No Growth after 72 hours














Assessment and Plan


(1) Cat bite of right hand


Status: Acute   Code(s): S61.451A - OPEN BITE OF RIGHT HAND, INITIAL ENCOUNTER; 

W55.01XA - BITTEN BY CAT, INITIAL ENCOUNTER   SNOMED Code(s): 511040312


   





(2) Pasteurella cellulitis due to cat bite


Status: Acute   Code(s): L03.90 - CELLULITIS, UNSPECIFIED; A28.0 - 

PASTEURELLOSIS; W55.01XA - BITTEN BY CAT, INITIAL ENCOUNTER   SNOMED Code(s): 

665506165


   


Plan: 


1patient was in the hospital with right hand cat bite cellulitis in this 

patient refused swelling redness of the right hand with some extension to the 

dorsal aspect of the right hand in the elbow area likely representing 

lymphangitis and will need to cover for the polymicrobial nash of the Mouth 

clinically not behaving as an abscess, CT of the hand was negative for any 

abscess


2-patient seemed to have shown clinical improvement with Unasyn 3 g every 6 

hours, plan is to finish therapy with oral Augmentin 875 twice a day for 10 days

and did close outpatient follow-up








Time with Patient: Less than 30

## 2023-04-08 NOTE — P.PN
Subjective


Progress Note Date: 04/06/23











Final diagnosis





Cat bite with acute severe cellulitis of the right hand


Severe pain, improving


Failure of outpatient treatment


Elevated lactic acid, improved








Discharge disposition


Patient is being discharged in a stable condition with guarded prognosis to 

home.  Patient will follow-up with Dr. Dillard  in the outpatient setting upon 

discharge.  Patient is to continue with oral Augmentin twice daily for the next 

10 days and close outpatient follow-up with infectious disease Dr. Conklin as 

scheduled.  Total time taken is greater than 35 minutes.





Hospital course


This is a 43-year-old female who was recently admitted for right hand swelling 

secondary to a cat bite with cellulitis that had been traveling down her arm 

with failed outpatient therapy.  Patient initially presented to the ER one day 

prior started on antibiotics.  Worsening pain and swelling and was concerned and

came back.  Patient did not start antibiotics only was given a dose of 

antibiotics in the ER.  Patient was evaluated by infectious disease along with 

orthopedics and not requiring any surgical intervention.  Patient was maintained

on IV antibiotics showing clinical improvement and will be continued on oral 

Augmentin twice daily for the next 10 days with close outpatient follow-up.  

Encourage the patient to elevate right extremity while at rest and monitor for 

any further signs of redness, drainage, increased swelling, or pain and 

instructed to follow-up with primary care provider this week.  Please refer to 

other consultation notes for further HPI.  Currently no reports of chest pain, 

shortness of breath, or palpitations.  Patient is afebrile.  No reports of 

nausea or vomiting and patient is tolerating diet.  Patient will be discharged 

home today.





Physical exam:








Gen: This is a pleasant 43-year-old female who is awake, alert and oriented 3, 

well-developed, well-nourished


HEENT: Head is atraumatic, normocephalic. Pupils equal, round. Sclerae is 

anicteric. 


NECK: Supple. No JVD. No lymphadenopathy. No thyromegaly. 


LUNGS: Clear to auscultation. No wheezes or rhonchi.  No intercostal 

retractions.


HEART: Regular rate and rhythm. No murmur. 


ABDOMEN: Soft. Bowel sounds are present. No masses.  No tenderness.


EXTREMITIES: No pedal edema.  No calf tenderness.  Right hand with significant 

improvement in swelling continues with some mild redness with no drainage noted


NEUROLOGICAL: Patient is awake, alert and oriented x3. Cranial nerves 2 through 

12 are grossly intact. 





Please refer to medication reconciliation sheet for a list of medications.





The impression and plan of care has been dictated by Thuy Stone, Nurse 

Practitioner as directed.





Dr. Camron MD


I have performed a history and examination and MDM of this patient, discussed 

the same with the dictator, and  agree with the dictator's assessment and plan 

as written ,documented as a scribe. Based on total visit time,  I have performed

more than 50% of the visit.  





Objective





- Vital Signs


Vital signs: 


                                   Vital Signs











Temp  97.7 F   04/06/23 07:54


 


Pulse  62   04/06/23 09:03


 


Resp  18   04/06/23 09:03


 


BP  110/63   04/06/23 07:54


 


Pulse Ox  99   04/06/23 07:54


 


FiO2      








                                 Intake & Output











 04/05/23 04/06/23 04/06/23





 18:59 06:59 18:59


 


Intake Total 500  240


 


Balance 500  240


 


Intake:   


 


  Oral 500  240


 


Other:   


 


  Voiding Method  Toilet Toilet


 


  # Voids 1 1 














- Labs


CBC & Chem 7: 


                                 04/06/23 05:48





                                 04/06/23 05:48


Labs: 


                  Abnormal Lab Results - Last 24 Hours (Table)











  04/06/23 Range/Units





  05:48 


 


BUN  8.9 L  (9.0-27.0)  mg/dL


 


BUN/Creatinine Ratio  11.13 L  (12.00-20.00)  Ratio








                      Microbiology - Last 24 Hours (Table)











 04/03/23 09:48 Blood Culture - Preliminary





 Blood    No Growth after 72 hours


 


 04/03/23 09:48 Blood Culture - Preliminary





 Blood    No Growth after 72 hours

## 2023-08-28 ENCOUNTER — HOSPITAL ENCOUNTER (OUTPATIENT)
Dept: HOSPITAL 47 - RADMAMWWP | Age: 43
Discharge: HOME | End: 2023-08-28
Attending: OBSTETRICS & GYNECOLOGY
Payer: COMMERCIAL

## 2023-08-28 DIAGNOSIS — Z80.3: ICD-10-CM

## 2023-08-28 DIAGNOSIS — Z12.31: Primary | ICD-10-CM

## 2023-08-28 PROCEDURE — 77063 BREAST TOMOSYNTHESIS BI: CPT

## 2023-08-28 PROCEDURE — 77067 SCR MAMMO BI INCL CAD: CPT

## 2023-08-29 NOTE — MM
Reason for Exam: Screening  (asymptomatic). 

Last screening mammogram was performed 12 month(s) ago.





Patient History: 

Menarche at age 11. First Full-Term Pregnancy at age 27. Currently using Hormonal Contraceptives,

for 12 years.

Maternal grandmother had breast cancer, age 82. Mother had breast cancer, age 54. 





Risk Values: 

Neda 5 year model risk: 1.4%.

NCI Lifetime model risk: 20.1%.





Prior Study Comparison: 

1/26/2017 Bilateral Screening Mammogram, [facility] with [facility]. 8/24/2021 Bilateral Screening Mammogram, Merged with [facility].

8/25/2022 Bilateral MG 3D screening mammo w/cad, Merged with [facility]. 





Tissue Density: 

The breast tissue is heterogeneously dense. This may lower the sensitivity of mammography.





Findings: 

Analyzed By CAD. 

There is no suspicious group of microcalcifications or new suspicious mass in either breast. 





Overall Assessment: Negative, BI-RAD 1





Management: 

Screening Mammogram of both breasts in 1 year.

[facility] will attempt to contact patient to return for supplemental views and

ultrasound if indicated.



Patient should continue monthly self-breast exams.  A clinical breast exam by your physician is

recommended on an annual basis.

This exam should not preclude additional follow-up of suspicious palpable abnormalities.



Note on Neda scores and lifetime risk:

1. A Neda score greater than 3% is considered moderate risk. If this is the case, consider

specialist referral to assess eligibility for a risk reducing agent.

2. If overall lifetime risk for the development of breast cancer is 20% or higher, the patient may

qualify for future screening with alternating mammogram and breast MRI.



Electronically signed and approved by: Gordon Tang DO

## 2024-08-29 ENCOUNTER — HOSPITAL ENCOUNTER (OUTPATIENT)
Dept: HOSPITAL 47 - RADMAMWWP | Age: 44
Discharge: HOME | End: 2024-08-29
Attending: OBSTETRICS & GYNECOLOGY
Payer: COMMERCIAL

## 2024-08-29 PROCEDURE — 77067 SCR MAMMO BI INCL CAD: CPT

## 2024-08-29 PROCEDURE — 77063 BREAST TOMOSYNTHESIS BI: CPT

## 2024-09-04 NOTE — MM
Reason for Exam: Screening  (asymptomatic). 

Last screening mammogram was performed 12 month(s) ago.





Patient History: 

Menarche at age 11. First Full-Term Pregnancy at age 27. Patient has history of breast feeding.

Currently using Hormonal Contraceptives, for 12 years.

Paternal grandmother had breast cancer, age 82. Mother had breast cancer, age 54. 





Risk Values: 

Neda 5 year model risk: 1.5%.

NCI Lifetime model risk: 19.9%.





Prior Study Comparison: 

8/24/2021 Bilateral Screening Mammogram, [facility]. 8/25/2022 Bilateral MG 3D screening mammo w/cad, [facility].

8/28/2023 Bilateral MG 3D screening mammo w/cad, [facility]. 





Tissue Density: 

There are scattered areas of fibroglandular density.





Findings: 

Analyzed By CAD. 

Right breast: Area of concern correlates with a focal asymmetry 12.6 cm from the nipple on the CC

view 7.4 cm from the nipple on MLO view posterior depth on both views.



Left breast: There is no suspicious group of microcalcifications or new suspicious mass. 





Overall Assessment: Incomplete: need additional imaging evaluation, BI-RAD 0





Management: 

Diagnostic Mammogram of the right breast.

Diagnostic Breast Ultrasound of the right breast.

[facility] will attempt to contact patient to return for supplemental views and

ultrasound if indicated.



Patient should continue monthly self-breast exams.  A clinical breast exam by your physician is

recommended on an annual basis.

This exam should not preclude additional follow-up of suspicious palpable abnormalities.



Note on Neda scores and lifetime risk:

1. A Neda score greater than 3% is considered moderate risk. If this is the case, consider

specialist referral to assess eligibility for a risk reducing agent.

2. If overall lifetime risk for the development of breast cancer is 20% or higher, the patient may

qualify for future screening with alternating mammogram and breast MRI.



Electronically signed and approved by: Gordon Tang DO

## 2024-09-13 ENCOUNTER — HOSPITAL ENCOUNTER (OUTPATIENT)
Dept: HOSPITAL 47 - RADMAMWWP | Age: 44
Discharge: HOME | End: 2024-09-13
Attending: OBSTETRICS & GYNECOLOGY
Payer: COMMERCIAL

## 2024-09-13 PROCEDURE — 77065 DX MAMMO INCL CAD UNI: CPT

## 2024-09-13 PROCEDURE — 77061 BREAST TOMOSYNTHESIS UNI: CPT

## 2024-09-13 NOTE — MM
Reason for Exam: Additional evaluation requested from abnormal screening. 

Last screening mammogram was performed less than 1 month ago.





Patient History: 

Menarche at age 11. First Full-Term Pregnancy at age 27. Patient has history of breast feeding.

Currently using Hormonal Contraceptives, for 12 years.

Paternal grandmother had breast cancer, age 82. Mother had breast cancer, age 54. 





Risk Values: 

Neda 5 year model risk: 1.5%.

NCI Lifetime model risk: 19.9%.





Prior Study Comparison: 

8/24/2021 Bilateral Screening Mammogram, [facility]. 8/25/2022 Bilateral MG 3D screening mammo w/cad, [facility].

8/28/2023 Bilateral MG 3D screening mammo w/cad, [facility]. 





Tissue Density: 

Right: The breasts are heterogeneously dense, which may obscure small masses.





Findings: 

Analyzed By CAD. 

Persistent asymmetric density upper outer right breast approximately 7 cm measuring 1 cm. Ultrasound

is recommended. 





Overall Assessment: Incomplete: need additional imaging evaluation, BI-RAD 0





Management: 

Diagnostic Breast Ultrasound of the right breast.

.  Results were given to the patient verbally at the time of exam.



Patient should continue monthly self-breast exams.  A clinical breast exam by your physician is

recommended on an annual basis.

This exam should not preclude additional follow-up of suspicious palpable abnormalities.





Note on Neda scores and lifetime risk:

1. A Neda score greater than 3% is considered moderate risk. If this is the case, consider

specialist referral to assess eligibility for a risk reducing agent.

2. If overall lifetime risk for the development of breast cancer is 20% or higher, the patient may

qualify for future screening with alternating mammogram and breast MRI.



Electronically signed and approved by: Leopold M. Fregoli, M.D. Radiologis

## 2024-09-13 NOTE — USB
Reason for Exam: Additional evaluation requested from abnormal screening. 





Patient History: 

Menarche at age 11. First Full-Term Pregnancy at age 27. Patient has history of breast feeding.

Currently using Hormonal Contraceptives, for 12 years.

Paternal grandmother had breast cancer, age 82. Mother had breast cancer, age 54. 





Risk Values: 

Neda 5 year model risk: 1.5%.

NCI Lifetime model risk: 19.9%.

Technique: 

Method: Targeted.  





Prior Study Comparison: 

8/25/2022 Bilateral MG 3D screening mammo w/cad, [facility]. 8/28/2023 Bilateral MG 3D screening mammo

w/cad, [facility]. 8/29/2024 Bilateral MG 3D screening mammo w/cad, [facility]. 





Findings: 

The upper outer quadrant of the right breast, the axilla of the right breast and the retroareolar of

the right breast were scanned.

No solid or cystic masses are identified. Given asymmetric density upper outer right on mammography

breast MRI is recommended.. 





Overall Assessment: Incomplete: need additional imaging evaluation, BI-RAD 0





Management: 

Diagnostic Breast MRI of both breasts.

A clinical breast exam by your physician is recommended on an annual basis and results should be

correlated with mammographic findings.  This exam should not preclude additional follow-up of

suspicious palpable abnormalities.  Results were given to the patient verbally at the time of exam.



Electronically signed and approved by: Leopold M. Fregoli, M.D. Radiologis